# Patient Record
Sex: FEMALE | Race: WHITE | Employment: OTHER | URBAN - NONMETROPOLITAN AREA
[De-identification: names, ages, dates, MRNs, and addresses within clinical notes are randomized per-mention and may not be internally consistent; named-entity substitution may affect disease eponyms.]

---

## 2022-11-22 ENCOUNTER — HOSPITAL ENCOUNTER (OUTPATIENT)
Dept: MRI IMAGING | Age: 72
Discharge: HOME | End: 2022-11-22

## 2022-11-22 DIAGNOSIS — Z00.6 CLINICAL TRIAL PARTICIPANT: ICD-10-CM

## 2022-11-22 PROCEDURE — 70551 MRI BRAIN STEM W/O DYE: CPT

## 2022-12-06 ENCOUNTER — HOSPITAL ENCOUNTER (OUTPATIENT)
Age: 72
Discharge: HOME | End: 2022-12-06

## 2022-12-14 ENCOUNTER — HOSPITAL ENCOUNTER (INPATIENT)
Age: 72
LOS: 4 days | Discharge: HOME | DRG: 066 | End: 2022-12-19
Attending: EMERGENCY MEDICINE | Admitting: HOSPITALIST
Payer: MEDICARE

## 2022-12-14 DIAGNOSIS — R42 DIZZINESS: ICD-10-CM

## 2022-12-14 DIAGNOSIS — J10.1 INFLUENZA A: ICD-10-CM

## 2022-12-14 DIAGNOSIS — R11.2 INTRACTABLE NAUSEA AND VOMITING: Primary | ICD-10-CM

## 2022-12-14 LAB
ALBUMIN SERPL-MCNC: 3.6 GM/DL (ref 3.4–5)
ALP BLD-CCNC: 140 U/L (ref 46–116)
ALT SERPL-CCNC: 32 U/L (ref 14–63)
ANION GAP: 12 MEQ/L (ref 8–16)
AST SERPL-CCNC: 26 U/L (ref 15–37)
BASOPHILS # BLD: 0.2 % (ref 0–3)
BASOPHILS ABSOLUTE: 0 THOU/MM3 (ref 0–0.1)
BILIRUB SERPL-MCNC: 0.5 MG/DL (ref 0.2–1)
BUN BLDV-MCNC: 19 MG/DL (ref 7–18)
CHLORIDE BLD-SCNC: 103 MEQ/L (ref 98–107)
CO2: 25 MEQ/L (ref 21–32)
CREAT SERPL-MCNC: 1.3 MG/DL (ref 0.6–1.3)
EOSINOPHILS ABSOLUTE: 0 THOU/MM3 (ref 0–0.5)
EOSINOPHILS RELATIVE PERCENT: 0.3 % (ref 0–4)
FLU A ANTIGEN: POSITIVE
FLU B ANTIGEN: NEGATIVE
GFR, ESTIMATED: 44 ML/MIN/1.73M2
GLUCOSE BLD-MCNC: 204 MG/DL (ref 74–106)
HCT VFR BLD CALC: 35.3 % (ref 37–47)
HEMOGLOBIN: 11.8 GM/DL (ref 12–16)
IMMATURE GRANS (ABS): 0.02 THOU/MM3 (ref 0–0.07)
IMMATURE GRANULOCYTES: 0 %
LIPASE: 135 U/L (ref 73–393)
LYMPHOCYTES # BLD: 10.6 % (ref 15–47)
LYMPHOCYTES ABSOLUTE: 0.9 THOU/MM3 (ref 1–4.8)
MCH RBC QN AUTO: 29.3 PG (ref 26–32)
MCHC RBC AUTO-ENTMCNC: 33.4 GM/DL (ref 31–35)
MCV RBC AUTO: 87.6 FL (ref 81–99)
MONOCYTES: 0.6 THOU/MM3 (ref 0.3–1.3)
MONOCYTES: 6.5 % (ref 0–12)
PDW BLD-RTO: 13 % (ref 11.5–14.9)
PLATELET # BLD: 200 THOU/MM3 (ref 130–400)
PMV BLD AUTO: 9.3 FL (ref 9.4–12.4)
POC CALCIUM: 8.3 MG/DL (ref 8.5–10.1)
POTASSIUM SERPL-SCNC: 3.8 MEQ/L (ref 3.5–5.1)
RBC # BLD: 4.03 MILL/MM3 (ref 4.1–5.3)
SARS-COV-2, NAAT: NOT  DETECTED
SEG NEUTROPHILS: 82.2 % (ref 43–75)
SEGMENTED NEUTROPHILS ABSOLUTE COUNT: 7.2 THOU/MM3 (ref 1.8–7.7)
SODIUM BLD-SCNC: 140 MEQ/L (ref 136–145)
TOTAL PROTEIN: 7.2 GM/DL (ref 6.4–8.2)
WBC # BLD: 8.8 THOU/MM3 (ref 4.8–10.8)

## 2022-12-14 PROCEDURE — 87804 INFLUENZA ASSAY W/OPTIC: CPT

## 2022-12-14 PROCEDURE — 93005 ELECTROCARDIOGRAM TRACING: CPT | Performed by: EMERGENCY MEDICINE

## 2022-12-14 PROCEDURE — 85025 COMPLETE CBC W/AUTO DIFF WBC: CPT

## 2022-12-14 PROCEDURE — 99285 EMERGENCY DEPT VISIT HI MDM: CPT

## 2022-12-14 PROCEDURE — 87635 SARS-COV-2 COVID-19 AMP PRB: CPT

## 2022-12-14 PROCEDURE — 96375 TX/PRO/DX INJ NEW DRUG ADDON: CPT

## 2022-12-14 PROCEDURE — 83690 ASSAY OF LIPASE: CPT

## 2022-12-14 PROCEDURE — 80053 COMPREHEN METABOLIC PANEL: CPT

## 2022-12-14 PROCEDURE — 2580000003 HC RX 258: Performed by: EMERGENCY MEDICINE

## 2022-12-14 PROCEDURE — 96374 THER/PROPH/DIAG INJ IV PUSH: CPT

## 2022-12-14 PROCEDURE — 6360000002 HC RX W HCPCS: Performed by: EMERGENCY MEDICINE

## 2022-12-14 RX ORDER — 0.9 % SODIUM CHLORIDE 0.9 %
1000 INTRAVENOUS SOLUTION INTRAVENOUS ONCE
Status: COMPLETED | OUTPATIENT
Start: 2022-12-14 | End: 2022-12-15

## 2022-12-14 RX ORDER — ONDANSETRON 2 MG/ML
4 INJECTION INTRAMUSCULAR; INTRAVENOUS ONCE
Status: COMPLETED | OUTPATIENT
Start: 2022-12-14 | End: 2022-12-14

## 2022-12-14 RX ADMIN — SODIUM CHLORIDE 1000 ML: 9 INJECTION, SOLUTION INTRAVENOUS at 22:26

## 2022-12-14 RX ADMIN — ONDANSETRON 4 MG: 2 INJECTION INTRAMUSCULAR; INTRAVENOUS at 22:28

## 2022-12-14 ASSESSMENT — ENCOUNTER SYMPTOMS
ABDOMINAL PAIN: 1
WHEEZING: 0
SORE THROAT: 0
NAUSEA: 1
VOMITING: 1
EYE DISCHARGE: 0
BLOOD IN STOOL: 0
EYE PAIN: 0
SHORTNESS OF BREATH: 0
DIARRHEA: 1

## 2022-12-14 ASSESSMENT — PAIN SCALES - GENERAL: PAINLEVEL_OUTOF10: 1

## 2022-12-14 ASSESSMENT — PAIN DESCRIPTION - LOCATION: LOCATION: ARM

## 2022-12-14 ASSESSMENT — LIFESTYLE VARIABLES: HOW OFTEN DO YOU HAVE A DRINK CONTAINING ALCOHOL: NEVER

## 2022-12-14 ASSESSMENT — PAIN DESCRIPTION - ORIENTATION: ORIENTATION: LEFT

## 2022-12-14 ASSESSMENT — PAIN - FUNCTIONAL ASSESSMENT: PAIN_FUNCTIONAL_ASSESSMENT: NONE - DENIES PAIN

## 2022-12-15 ENCOUNTER — APPOINTMENT (OUTPATIENT)
Dept: MRI IMAGING | Age: 72
DRG: 066 | End: 2022-12-15
Payer: MEDICARE

## 2022-12-15 ENCOUNTER — APPOINTMENT (OUTPATIENT)
Dept: CT IMAGING | Age: 72
DRG: 066 | End: 2022-12-15
Payer: MEDICARE

## 2022-12-15 PROBLEM — R42 VERTIGO: Status: ACTIVE | Noted: 2022-12-15

## 2022-12-15 LAB
ANION GAP SERPL CALCULATED.3IONS-SCNC: 11 MEQ/L (ref 8–16)
BUN BLDV-MCNC: 20 MG/DL (ref 7–22)
CALCIUM SERPL-MCNC: 8.4 MG/DL (ref 8.5–10.5)
CHLORIDE BLD-SCNC: 105 MEQ/L (ref 98–111)
CO2: 24 MEQ/L (ref 23–33)
CREAT SERPL-MCNC: 1.2 MG/DL (ref 0.4–1.2)
EKG ATRIAL RATE: 68 BPM
EKG P AXIS: -3 DEGREES
EKG P-R INTERVAL: 160 MS
EKG Q-T INTERVAL: 410 MS
EKG QRS DURATION: 74 MS
EKG QTC CALCULATION (BAZETT): 435 MS
EKG R AXIS: -40 DEGREES
EKG T AXIS: 66 DEGREES
EKG VENTRICULAR RATE: 68 BPM
GFR SERPL CREATININE-BSD FRML MDRD: 48 ML/MIN/1.73M2
GLUCOSE BLD-MCNC: 107 MG/DL (ref 70–108)
GLUCOSE BLD-MCNC: 122 MG/DL (ref 70–108)
GLUCOSE BLD-MCNC: 128 MG/DL (ref 70–108)
GLUCOSE BLD-MCNC: 153 MG/DL (ref 70–108)
POTASSIUM SERPL-SCNC: 4 MEQ/L (ref 3.5–5.2)
SODIUM BLD-SCNC: 140 MEQ/L (ref 135–145)

## 2022-12-15 PROCEDURE — 97535 SELF CARE MNGMENT TRAINING: CPT

## 2022-12-15 PROCEDURE — 99223 1ST HOSP IP/OBS HIGH 75: CPT | Performed by: PHYSICIAN ASSISTANT

## 2022-12-15 PROCEDURE — 36415 COLL VENOUS BLD VENIPUNCTURE: CPT

## 2022-12-15 PROCEDURE — 82948 REAGENT STRIP/BLOOD GLUCOSE: CPT

## 2022-12-15 PROCEDURE — 97162 PT EVAL MOD COMPLEX 30 MIN: CPT

## 2022-12-15 PROCEDURE — 6360000002 HC RX W HCPCS: Performed by: PHYSICIAN ASSISTANT

## 2022-12-15 PROCEDURE — 6370000000 HC RX 637 (ALT 250 FOR IP): Performed by: EMERGENCY MEDICINE

## 2022-12-15 PROCEDURE — 70551 MRI BRAIN STEM W/O DYE: CPT

## 2022-12-15 PROCEDURE — 93010 ELECTROCARDIOGRAM REPORT: CPT | Performed by: INTERNAL MEDICINE

## 2022-12-15 PROCEDURE — 6360000002 HC RX W HCPCS: Performed by: EMERGENCY MEDICINE

## 2022-12-15 PROCEDURE — 6370000000 HC RX 637 (ALT 250 FOR IP): Performed by: PHYSICIAN ASSISTANT

## 2022-12-15 PROCEDURE — 97166 OT EVAL MOD COMPLEX 45 MIN: CPT

## 2022-12-15 PROCEDURE — 97530 THERAPEUTIC ACTIVITIES: CPT

## 2022-12-15 PROCEDURE — 1200000003 HC TELEMETRY R&B

## 2022-12-15 PROCEDURE — 70450 CT HEAD/BRAIN W/O DYE: CPT

## 2022-12-15 PROCEDURE — 80048 BASIC METABOLIC PNL TOTAL CA: CPT

## 2022-12-15 RX ORDER — ENOXAPARIN SODIUM 100 MG/ML
40 INJECTION SUBCUTANEOUS DAILY
Status: DISCONTINUED | OUTPATIENT
Start: 2022-12-15 | End: 2022-12-19 | Stop reason: HOSPADM

## 2022-12-15 RX ORDER — ACETAMINOPHEN 650 MG/1
650 SUPPOSITORY RECTAL EVERY 4 HOURS PRN
Status: DISCONTINUED | OUTPATIENT
Start: 2022-12-15 | End: 2022-12-19 | Stop reason: HOSPADM

## 2022-12-15 RX ORDER — ONDANSETRON 4 MG/1
4 TABLET, ORALLY DISINTEGRATING ORAL EVERY 8 HOURS PRN
Status: DISCONTINUED | OUTPATIENT
Start: 2022-12-15 | End: 2022-12-19 | Stop reason: HOSPADM

## 2022-12-15 RX ORDER — DEXTROSE MONOHYDRATE 100 MG/ML
INJECTION, SOLUTION INTRAVENOUS CONTINUOUS PRN
Status: DISCONTINUED | OUTPATIENT
Start: 2022-12-15 | End: 2022-12-19 | Stop reason: HOSPADM

## 2022-12-15 RX ORDER — ACETAMINOPHEN 325 MG/1
650 TABLET ORAL EVERY 4 HOURS PRN
Status: DISCONTINUED | OUTPATIENT
Start: 2022-12-15 | End: 2022-12-19 | Stop reason: HOSPADM

## 2022-12-15 RX ORDER — SODIUM CHLORIDE 9 MG/ML
INJECTION, SOLUTION INTRAVENOUS CONTINUOUS
Status: DISCONTINUED | OUTPATIENT
Start: 2022-12-15 | End: 2022-12-17

## 2022-12-15 RX ORDER — ONDANSETRON 2 MG/ML
4 INJECTION INTRAMUSCULAR; INTRAVENOUS EVERY 6 HOURS PRN
Status: DISCONTINUED | OUTPATIENT
Start: 2022-12-15 | End: 2022-12-19 | Stop reason: HOSPADM

## 2022-12-15 RX ORDER — INSULIN LISPRO 100 [IU]/ML
0-4 INJECTION, SOLUTION INTRAVENOUS; SUBCUTANEOUS
Status: DISCONTINUED | OUTPATIENT
Start: 2022-12-15 | End: 2022-12-19 | Stop reason: HOSPADM

## 2022-12-15 RX ORDER — MECLIZINE HCL 25MG 25 MG/1
25 TABLET, CHEWABLE ORAL 3 TIMES DAILY PRN
Status: DISCONTINUED | OUTPATIENT
Start: 2022-12-15 | End: 2022-12-16

## 2022-12-15 RX ORDER — MECLIZINE HCL 25MG 25 MG/1
25 TABLET, CHEWABLE ORAL ONCE
Status: COMPLETED | OUTPATIENT
Start: 2022-12-15 | End: 2022-12-15

## 2022-12-15 RX ORDER — KETOROLAC TROMETHAMINE 30 MG/ML
15 INJECTION, SOLUTION INTRAMUSCULAR; INTRAVENOUS ONCE
Status: COMPLETED | OUTPATIENT
Start: 2022-12-15 | End: 2022-12-15

## 2022-12-15 RX ORDER — ASPIRIN 300 MG/1
300 SUPPOSITORY RECTAL DAILY
Status: DISCONTINUED | OUTPATIENT
Start: 2022-12-15 | End: 2022-12-19 | Stop reason: HOSPADM

## 2022-12-15 RX ORDER — ASPIRIN 81 MG/1
81 TABLET ORAL DAILY
Status: DISCONTINUED | OUTPATIENT
Start: 2022-12-15 | End: 2022-12-19 | Stop reason: HOSPADM

## 2022-12-15 RX ORDER — POLYETHYLENE GLYCOL 3350 17 G/17G
17 POWDER, FOR SOLUTION ORAL DAILY PRN
Status: DISCONTINUED | OUTPATIENT
Start: 2022-12-15 | End: 2022-12-19 | Stop reason: HOSPADM

## 2022-12-15 RX ORDER — INSULIN LISPRO 100 [IU]/ML
0-4 INJECTION, SOLUTION INTRAVENOUS; SUBCUTANEOUS NIGHTLY
Status: DISCONTINUED | OUTPATIENT
Start: 2022-12-15 | End: 2022-12-19 | Stop reason: HOSPADM

## 2022-12-15 RX ORDER — ONDANSETRON 2 MG/ML
4 INJECTION INTRAMUSCULAR; INTRAVENOUS ONCE
Status: COMPLETED | OUTPATIENT
Start: 2022-12-15 | End: 2022-12-15

## 2022-12-15 RX ORDER — ATORVASTATIN CALCIUM 80 MG/1
80 TABLET, FILM COATED ORAL NIGHTLY
Status: DISCONTINUED | OUTPATIENT
Start: 2022-12-15 | End: 2022-12-19 | Stop reason: HOSPADM

## 2022-12-15 RX ADMIN — MECLIZINE HYDROCHLORIDE 25 MG: 25 TABLET, CHEWABLE ORAL at 00:27

## 2022-12-15 RX ADMIN — ATORVASTATIN CALCIUM 80 MG: 80 TABLET, FILM COATED ORAL at 20:37

## 2022-12-15 RX ADMIN — ACETAMINOPHEN 650 MG: 325 TABLET ORAL at 20:37

## 2022-12-15 RX ADMIN — ENOXAPARIN SODIUM 40 MG: 100 INJECTION SUBCUTANEOUS at 08:02

## 2022-12-15 RX ADMIN — KETOROLAC TROMETHAMINE 15 MG: 30 INJECTION, SOLUTION INTRAMUSCULAR; INTRAVENOUS at 01:24

## 2022-12-15 RX ADMIN — ONDANSETRON 4 MG: 2 INJECTION INTRAMUSCULAR; INTRAVENOUS at 07:58

## 2022-12-15 RX ADMIN — ACETAMINOPHEN 650 MG: 325 TABLET ORAL at 15:06

## 2022-12-15 RX ADMIN — ACETAMINOPHEN 650 MG: 325 TABLET ORAL at 05:38

## 2022-12-15 RX ADMIN — ONDANSETRON 4 MG: 2 INJECTION INTRAMUSCULAR; INTRAVENOUS at 03:07

## 2022-12-15 ASSESSMENT — ENCOUNTER SYMPTOMS
PHOTOPHOBIA: 0
ABDOMINAL PAIN: 0
VOMITING: 0
DIARRHEA: 1
RHINORRHEA: 0
COUGH: 1
EYES NEGATIVE: 1
SORE THROAT: 0
SHORTNESS OF BREATH: 0
NAUSEA: 1
ALLERGIC/IMMUNOLOGIC NEGATIVE: 1
GASTROINTESTINAL NEGATIVE: 1

## 2022-12-15 ASSESSMENT — PAIN SCALES - GENERAL
PAINLEVEL_OUTOF10: 5
PAINLEVEL_OUTOF10: 2
PAINLEVEL_OUTOF10: 0
PAINLEVEL_OUTOF10: 9
PAINLEVEL_OUTOF10: 9
PAINLEVEL_OUTOF10: 5
PAINLEVEL_OUTOF10: 5

## 2022-12-15 ASSESSMENT — PAIN DESCRIPTION - DESCRIPTORS
DESCRIPTORS: ACHING
DESCRIPTORS_2: ACHING
DESCRIPTORS: DULL
DESCRIPTORS: ACHING
DESCRIPTORS: ACHING

## 2022-12-15 ASSESSMENT — PAIN - FUNCTIONAL ASSESSMENT
PAIN_FUNCTIONAL_ASSESSMENT: ACTIVITIES ARE NOT PREVENTED
PAIN_FUNCTIONAL_ASSESSMENT: NONE - DENIES PAIN
PAIN_FUNCTIONAL_ASSESSMENT: ACTIVITIES ARE NOT PREVENTED
PAIN_FUNCTIONAL_ASSESSMENT_SITE2: ACTIVITIES ARE NOT PREVENTED
PAIN_FUNCTIONAL_ASSESSMENT: ACTIVITIES ARE NOT PREVENTED
PAIN_FUNCTIONAL_ASSESSMENT: NONE - DENIES PAIN

## 2022-12-15 ASSESSMENT — PAIN DESCRIPTION - ORIENTATION
ORIENTATION: ANTERIOR;POSTERIOR;MID
ORIENTATION_2: POSTERIOR;ANTERIOR
ORIENTATION: LEFT

## 2022-12-15 ASSESSMENT — PAIN DESCRIPTION - LOCATION
LOCATION: HEAD
LOCATION: ARM
LOCATION_2: HEAD
LOCATION: HEAD;ABDOMEN
LOCATION: ARM
LOCATION: ARM

## 2022-12-15 ASSESSMENT — PAIN DESCRIPTION - PAIN TYPE
TYPE: ACUTE PAIN
TYPE: ACUTE PAIN

## 2022-12-15 ASSESSMENT — PAIN DESCRIPTION - INTENSITY: RATING_2: 0

## 2022-12-15 ASSESSMENT — PAIN DESCRIPTION - ONSET
ONSET: ON-GOING
ONSET: ON-GOING

## 2022-12-15 ASSESSMENT — PAIN DESCRIPTION - FREQUENCY
FREQUENCY: CONTINUOUS
FREQUENCY: INTERMITTENT

## 2022-12-15 NOTE — CONSULTS
Neurology Consult Note    Date:12/15/2022       GBJ:[id number]  Patient Rachel Yi     YOB: 1950     Age:72 y.o. Requesting Physician: PARDEEP Gonzalez     Reason for Consult:  Evaluate for vertigo, vision changes      Chief Complaint:   Chief Complaint   Patient presents with    Emesis    Dizziness    Fall    Diarrhea       Subjective     Rajan Dewey is a 71 y.o. female with a history of diabetes, HTN who is admitted to [address] [place] [address] as a direct admission from [facility] where she presented last night due to two days of generalized weakness, nausea, dizziness, diarrhea. Our service was consulted to evaluate for vertigo and vision changes. She reports falling yesterday due to the weakness, but denies hitting her head. She endorses feeling as if the room is spinning around her. Today, she denies any vision changes. She is currently influenza A positive. She denies any events like this ever happening before. She reports \"walking as if she were drunk\" and staggering. She describes her headache as a bifrontal and posterior occipital that is improving today from prior. She denies any confusion at any point over the last two days. She denies vision changes, difficulty speaking or understanding, sensory loss, numbness or tingling, chest pain, SOB, tremors, LOC, focal weakness. She admits to chills, nasal congestion, cough, generalized weakness and fatigue. She reports improvement of nausea and diarrhea, although she has decreased appetite. She denies personal history of stroke, but reports her father and one of her grandparents had a stroke. She was started on ASA 81 mg, Lipitor 80 mg and meclizine 25 mg PRN overnight. She is also on IVF since admission. Of note, patient is a current participant in a clinical trial for Alzheimer disease. She also reports nausea correlating to dizziness at times, but not all of the time. CT head from outside facility negative for acute findings.      Review of Systems   Review of Systems   Constitutional:  Positive for appetite change and fatigue. Negative for chills and fever. HENT:  Positive for congestion. Negative for rhinorrhea and sore throat. Eyes:  Negative for photophobia and visual disturbance. Respiratory:  Positive for cough. Negative for shortness of breath. Cardiovascular:  Negative for chest pain and palpitations. Gastrointestinal:  Positive for diarrhea and nausea. Negative for abdominal pain and vomiting. Genitourinary:  Negative for dysuria. Musculoskeletal:  Positive for myalgias (LUE). Negative for arthralgias, neck pain and neck stiffness. Skin:  Negative for rash. Neurological:  Positive for dizziness, weakness and headaches. Negative for tremors, seizures, facial asymmetry, speech difficulty and numbness. Psychiatric/Behavioral:  Negative for confusion and decreased concentration. The patient is nervous/anxious. Medications   Scheduled Meds:    enoxaparin  40 mg SubCUTAneous Daily    aspirin  81 mg Oral Daily    Or    aspirin  300 mg Rectal Daily    atorvastatin  80 mg Oral Nightly    insulin lispro  0-4 Units SubCUTAneous TID WC    insulin lispro  0-4 Units SubCUTAneous Nightly     Continuous Infusions:    sodium chloride 75 mL/hr at 12/15/22 0544    dextrose       PRN Meds: ondansetron **OR** ondansetron, polyethylene glycol, acetaminophen **OR** acetaminophen, meclizine, glucose, dextrose bolus **OR** dextrose bolus, glucagon (rDNA), dextrose  Medications Prior to Admission:   No current facility-administered medications on file prior to encounter. Current Outpatient Medications on File Prior to Encounter   Medication Sig Dispense Refill    metFORMIN (GLUCOPHAGE) 500 MG tablet Take 250 mg by mouth 2 times daily (with meals)      Naproxen (NAPROSYN PO) Take by mouth as needed (for pain)       Past History    Past Medical History:   has a past medical history of Diabetes mellitus ([facility] [address]     Social History:   reports that she has never smoked. She has never been exposed to tobacco smoke. She has never used smokeless tobacco. She reports that she does not drink alcohol and does not use drugs. Family History: History reviewed. No pertinent family history. Physical Examination      Vitals:  BP (!) 171/85   Pulse 73   Temp 97.9 °F (36.6 °C) (Oral)   Resp 18   Ht 5' 3.5\" (1.613 m)   Wt 165 lb 9.1 oz (75.1 kg)   SpO2 99%   BMI 28.87 kg/m²   Temp (24hrs), Av.3 °F (36.3 °C), Min:96 °F (35.6 °C), Max:98.4 °F (36.9 °C)      I/O (24Hr): No intake or output data in the 24 hours ending 12/15/22 0844      Physical Exam  Vitals reviewed. Constitutional:       General: She is not in acute distress. Appearance: She is ill-appearing. HENT:      Head: Normocephalic and atraumatic. Right Ear: External ear normal.      Left Ear: External ear normal.      Nose: Nose normal.      Mouth/Throat:      Mouth: Mucous membranes are dry. Pharynx: No oropharyngeal exudate or posterior oropharyngeal erythema. Eyes:      Extraocular Movements: Extraocular movements intact and EOM normal.      Pupils: Pupils are equal, round, and reactive to light. Cardiovascular:      Rate and Rhythm: Normal rate and regular rhythm. Pulses: Normal pulses. Heart sounds: Normal heart sounds. No murmur heard. Pulmonary:      Effort: Pulmonary effort is normal. No respiratory distress. Breath sounds: Normal breath sounds. No wheezing. Abdominal:      General: Bowel sounds are normal.      Palpations: Abdomen is soft. Tenderness: There is no abdominal tenderness. Musculoskeletal:         General: Normal range of motion. Cervical back: Normal range of motion. No tenderness. Right lower leg: No edema. Left lower leg: No edema. Skin:     General: Skin is warm. Findings: No rash. Neurological:      Mental Status: She is alert and oriented to person, place, and time.       Coordination: Finger-Nose-Finger Test and Heel to NIX [facility] Test normal.   Psychiatric:         Mood and Affect: Mood normal.         Speech: Speech normal.         Behavior: Behavior normal.     Neurologic Exam     Mental Status   Oriented to person, place, and time. Follows 1 step commands. Attention: normal. Concentration: normal.   Speech: speech is normal   Level of consciousness: alert  Able to repeat. Normal comprehension. Cranial Nerves     CN II   Visual fields full to confrontation. Right visual field deficit: none  Left visual field deficit: none     CN III, IV, VI   Pupils are equal, round, and reactive to light. Extraocular motions are normal.     CN V   Facial sensation intact. Right facial sensation deficit: none  Left facial sensation deficit: none    CN VII   Facial expression full, symmetric. Right facial weakness: none  Left facial weakness: none    CN VIII   CN VIII normal.   Hearing: intact    CN IX, X   CN IX normal.   CN X normal.   Palate: symmetric    CN XI   CN XI normal.   Right sternocleidomastoid strength: normal  Left sternocleidomastoid strength: normal  Right trapezius strength: normal  Left trapezius strength: normal    CN XII   CN XII normal.   Tongue: not atrophic  Fasciculations: absent  Tongue deviation: none    Motor Exam   Muscle bulk: normal  Overall muscle tone: normal  Right arm pronator drift: absent  Left arm pronator drift: absent  Muscle strength 5/5 bilateral upper extremities, 4+/5 bilateral lower extremities. Sensory Exam   Light touch normal.     Gait, Coordination, and Reflexes     Coordination   Finger to nose coordination: normal  Heel to shin coordination: normal    Tremor   Resting tremor: absent  No adventitious motor movements noted on exam. Patient unable to march in place unassisted.  She is able to stand unassisted for a short period of time once stabilized on her feet with her eyes opened and closed, but does sway forward and backward - more so with eyes closed      Labs/Imaging/Diagnostics   Labs:  CBC:  Recent Labs     12/14/22 2221   WBC 8.8   RBC 4.03*   HGB 11.8*   HCT 35.3*   MCV 87.6   RDW 13.0        CHEMISTRIES:  Recent Labs     12/14/22  2221 12/15/22  0756    140   K 3.8 4.0    105   CO2 25 24   BUN 19* 20   CREATININE 1.3 1.2   GLUCOSE 204* 153*     COAGULATION STUDIES:No results for input(s): PROTIME, INR, APTT in the last 72 hours. LIVER PROFILE:  Recent Labs     12/14/22 2221   AST 26   ALT 32   BILITOT 0.5   ALKPHOS 140*     CHOLESTEROL AND A1C:No results for input(s): LDLCALC, HDL, CHOL, TRIG, LABA1C in the last 720 hours. Imaging Last 24 Hours:  CT HEAD WO CONTRAST    Result Date: 12/15/2022  CT head without contrast Comparison: MRI brain November 22, 2022 Findings: No intracranial mass, midline shift, hydrocephalus, or acute hemorrhage. Minimal changes of chronic microvascular ischemic disease. Mild mucosal thickening of the ethmoid sinuses. The orbits are unremarkable. No skull fracture. 1. No acute intracranial process. 2. Minimal changes of chronic microvascular ischemic disease. This document has been electronically signed by: Asim Martinez DO on 12/15/2022 03:26 AM All CTs at this facility use dose modulation techniques and iterative reconstructions, and/or weight-based dosing when appropriate to reduce radiation to a low as reasonably achievable. Assessment and Plan:        Dizziness, likely vertigo secondary to labyrinthitis as an extension of current influenza infection  Orthostatic hypotension   CT head WO: negative for intracranial abnormalities  Neurology orthostatics positive from laying to standing, although unable to stand for 5 minutes for a final BP/HR. CTA head and neck ordered. MRI brain WO ordered. Maintain adequate fluid hydration. IVF as needed, per primary. Encourage adequate nutrition. Agree with continued infectious and metabolic work-up with treatment as applicable, per primary. Continue ASA 81 mg   HgbA1C in the morning. If the patient has diabetes, recommend tight control of A1c with goal of <7.0 if attainable. Lipid panel in the morning. LDL goal of 45-70. Continue Lipitor 80 mg, pending lipid panel. Neurology following peripherally, pending CTA H&N/MRI    This patient was seen and evaluated with Dr. Kenan Loera and he is in agreement with the assessment and plan.     Electronically signed by Carmelo Flores PA-C on 12/15/22 at 3:26 PM EST

## 2022-12-15 NOTE — ED NOTES
[facility] [employer] arrived, report given to [employer].       Palma Holter, RN  12/15/22 6021

## 2022-12-15 NOTE — H&P
Hospitalist - History & Physical      Patient: Richard Das    Unit/Bed:-02/002-A  YOB: 1950  MRN: [id number]   Acct: [de-identified]   PCP: Jeane Lennox , APRN - CNP      Assessment and Plan:        Vertigo:   MRI brain without contrast  2D ECHO  Permissive hypertension  Meclizine prn  Start statin and ASA  Essential hypertension:   Permissive hypertension for now  Patient states her oral agents were all stopped because her blood pressures were normal - requires outpatient follow up  DM II:   Low dose sliding scale  Hypoglycemic treatment orders  ADA diet  Influenza A  Supportive care  On room air      CC:  dizziness    HPI: Patient transferred from [facility] for further evaluation of vertigo. The patient reports that one day prior to admission she started having \"dizziness\" that she best describes as the room spinning. The patient denies any focal deficits. She reports some changes in her vision - difficulty focusing. She also complains of a headache. Patient is admitted for further evaluation and stroke rule out. ROS: Review of Systems   Constitutional:  Negative for fever. HENT: Negative. Eyes: Negative. Respiratory:  Positive for cough. Negative for shortness of breath. Cardiovascular: Negative. Gastrointestinal: Negative. Endocrine: Negative. Genitourinary: Negative. Musculoskeletal:  Positive for arthralgias and myalgias. Skin: Negative. Allergic/Immunologic: Negative. Neurological:  Positive for dizziness and headaches. Hematological: Negative. Psychiatric/Behavioral: Negative.        PMH:    Past Medical History:   Diagnosis Date    Diabetes mellitus ([facility] [address]      SHX:    Social History     Socioeconomic History    Marital status: Single     Spouse name: Not on file    Number of children: Not on file    Years of education: Not on file    Highest education level: Not on file   Occupational History    Not on file Tobacco Use    Smoking status: Never     Passive exposure: Never    Smokeless tobacco: Never   Vaping Use    Vaping Use: Never used   Substance and Sexual Activity    Alcohol use: Never    Drug use: Never    Sexual activity: Never   Other Topics Concern    Not on file   Social History Narrative    Not on file     Social Determinants of Health     Financial Resource Strain: Not on file   Food Insecurity: Not on file   Transportation Needs: Not on file   Physical Activity: Not on file   Stress: Not on file   Social Connections: Not on file   Intimate Partner Violence: Not on file   Housing Stability: Not on file     FHX: History reviewed. No pertinent family history. Allergies: Allergies   Allergen Reactions    Other      Medication given for a urinary infection, allergic to it, passed out.      Medications:     sodium chloride        enoxaparin  40 mg SubCUTAneous Daily    aspirin  81 mg Oral Daily    Or    aspirin  300 mg Rectal Daily    atorvastatin  80 mg Oral Nightly     ondansetron, 4 mg, Q8H PRN   Or  ondansetron, 4 mg, Q6H PRN  polyethylene glycol, 17 g, Daily PRN  acetaminophen, 650 mg, Q4H PRN   Or  acetaminophen, 650 mg, Q4H PRN        Labs:   Recent Results (from the past 24 hour(s))   EKG Emergency    Collection Time: 12/14/22  9:55 PM   Result Value Ref Range    Ventricular Rate 68 BPM    Atrial Rate 68 BPM    P-R Interval 160 ms    QRS Duration 74 ms    Q-T Interval 410 ms    QTc Calculation (Bazett) 435 ms    P Axis -3 degrees    R Axis -40 degrees    T Axis 66 degrees   CBC with Auto Differential    Collection Time: 12/14/22 10:21 PM   Result Value Ref Range    WBC 8.8 4.8 - 10.8 thou/mm3    RBC 4.03 (L) 4.10 - 5.30 mill/mm3    Hemoglobin 11.8 (L) 12.0 - 16.0 gm/dl    Hematocrit 35.3 (L) 37.0 - 47.0 %    MCV 87.6 81.0 - 99.0 fL    MCH 29.3 26.0 - 32.0 pg    MCHC 33.4 31.0 - 35.0 gm/dl    RDW 13.0 11.5 - 14.9 %    Platelets 945 719 - 643 thou/mm3    MPV 9.3 (L) 9.4 - 12.4 fL    Seg Neutrophils 82.2 (H) 43.0 - 75.0 %    Segs Absolute 7.2 1.8 - 7.7 thou/mm3    Lymphocytes 10.6 (L) 15.0 - 47.0 %    Lymphocytes Absolute 0.9 (L) 1.0 - 4.8 thou/mm3    Monocytes 6.5 0.0 - 12.0 %    Monocytes 0.6 0.3 - 1.3 thou/mm3    Eosinophils % 0.3 0.0 - 4.0 %    Eosinophils Absolute 0.0 0.0 - 0.5 thou/mm3    Basophils 0.2 0.0 - 3.0 %    Basophils Absolute 0.0 0.0 - 0.1 thou/mm3    Immature Granulocytes 0 %    Immature Grans (Abs) 0.02 0.00 - 0.07 thou/mm3   Lipase    Collection Time: 12/14/22 10:21 PM   Result Value Ref Range    Lipase 135.0 73.0 - 393.0 U/L   Comprehensive Metabolic Panel    Collection Time: 12/14/22 10:21 PM   Result Value Ref Range    Glucose 204 (H) 74 - 106 mg/dl    Creatinine 1.3 0.6 - 1.3 mg/dl    BUN 19 (H) 7 - 18 mg/dl    Sodium 140 136 - 145 meq/l    Potassium 3.8 3.5 - 5.1 meq/l    Chloride 103 98 - 107 meq/l    CO2 25 21 - 32 meq/l    POC CALCIUM 8.3 (L) 8.5 - 10.1 mg/dl    AST 26 15 - 37 U/L    Alkaline Phosphatase 140 (H) 46 - 116 U/L    Total Protein 7.2 6.4 - 8.2 gm/dl    Albumin 3.6 3.4 - 5.0 gm/dl    Total Bilirubin 0.5 0.2 - 1.0 mg/dl    ALT 32 14 - 63 U/L   Glomerular Filtration Rate, Estimated    Collection Time: 12/14/22 10:21 PM   Result Value Ref Range    GFR, Estimated 44 (A) >60 ml/min/1.73m2   ANION GAP    Collection Time: 12/14/22 10:21 PM   Result Value Ref Range    Anion Gap 12.0 8.0 - 16.0 meq/l   COVID-19, Rapid    Collection Time: 12/14/22 10:23 PM    Specimen: Nasopharyngeal Swab   Result Value Ref Range    SARS-CoV-2, NAAT NOT  DETECTED NOT DETECTED   Rapid influenza A/B antigens    Collection Time: 12/14/22 10:23 PM    Specimen: Nasopharyngeal   Result Value Ref Range    Flu A Antigen Positive (A) NEGATIVE    Flu B Antigen Negative NEGATIVE         Vital Signs: T: 98.4F P: 75 RR: 17 B/P: 167/66: FiO2: RA: O2 Sat:95%: I/O: No intake or output data in the 24 hours ending 12/15/22 0508      General:   no acute distress  HEENT:  normocephalic and atraumatic. No scleral icterus. PEARLA, mucous membranes moist, patient does have some nystagmus but doesn't tolerate moving her eyes quickly  Neck: supple. Trachea midline. No JVD. Full ROM, no meningismus. Lungs: clear to auscultation. No retractions, no accessory muscle use. Cardiac: RRR, no murmur, 2+ pulses  Abdomen: soft. Nontender. Bowel sounds active  Extremities:  No clubbing, cyanosis x 4, no edema    Vasculature: capillary refill < 3 seconds. Skin:  warm and dry. no visible rashes  Psych:  Alert and oriented x3. Affect appropriate  Lymph:  No supraclavicular adenopathy. Neurologic:  CN II-XII grossly intact. No focal deficit. Data: (All radiographs, tracings, PFTs, and imaging are personally viewed and interpreted unless otherwise noted).    Outside data reviewed  EKG: rhythm: normal sinus rhythm, rate=68 bpm, pr=160 ms, qrs=74 ms, ud=985 ms, axis=-3 degrees        Electronically signed by  Sara Vargas PA-C

## 2022-12-15 NOTE — PROGRESS NOTES
Pt admitted to  [facility] via cart/stretcher. Complaints: nausea/vomiting, dizziness, headache. IV normal saline infusing into the antecubital right, condition patent and no redness at a rate of 100 mls/ hour with about 900 mls in the bag still. IV site free of s/s of infection or infiltration. Vital signs obtained. Assessment and data collection initiated. Two nurse skin assessment performed by Shawna Barber RN and Darlin Brandt RN. Oriented to room. Policies and procedures for  explained. All questions answered with no further questions at this time. Fall prevention and safety brochure discussed with patient. Bed alarm on. Call light in reach.

## 2022-12-15 NOTE — ED PROVIDER NOTES
[address]  [address] [facility] [address]  Phone: [phone]    eMERGENCY dEPARTMENT eNCOUnter           [address] [facility]       Chief Complaint   Patient presents with    Emesis    Dizziness    Fall    Diarrhea       Nurses Notes reviewed and I agree except as noted in the HPI. HISTORY OF PRESENT ILLNESS    Perry Silva is a 71 y.o. female who presented via private vehicle with the above-mentioned complaints. She is accompanied sister. She presented with 24-hour history of nausea, vomiting and diarrhea. She has numerous episode of clear emesis and watery diarrhea. She complains of mild diffuse abdominal cramping. She also had lightheadedness or dizziness. She denies visual or speech changes. She denies focal weakness or numbness. She fell struck her left side. She denies injury. She denies chest pain or shortness of breath. She has no fever or chills. She has history of diabetes, she is on metformin. She denies smoking tobacco, drink alcohol or using illicit drugs. REVIEW OF SYSTEMS     Review of Systems   Constitutional:  Positive for fatigue. Negative for chills and fever. HENT:  Positive for congestion. Negative for sore throat. Eyes:  Negative for pain, discharge and visual disturbance. Respiratory:  Negative for shortness of breath and wheezing. Cardiovascular:  Negative for chest pain and palpitations. Gastrointestinal:  Positive for abdominal pain, diarrhea, nausea and vomiting. Negative for blood in stool. Genitourinary:  Negative for dysuria and hematuria. Musculoskeletal:  Negative for neck pain and neck stiffness. Neurological:  Positive for dizziness. Negative for seizures, syncope and headaches. Hematological:  Negative for adenopathy. Psychiatric/Behavioral:  Negative for confusion. PAST MEDICAL HISTORY    has a past medical history of Diabetes mellitus ([facility] [address]     SURGICAL HISTORY      has no past surgical history on file.    CURRENT MEDICATIONS       Previous Medications    METFORMIN (GLUCOPHAGE) 500 MG TABLET    Take 250 mg by mouth 2 times daily (with meals)    NAPROXEN (NAPROSYN PO)    Take by mouth       ALLERGIES     is allergic to other. FAMILY HISTORY     has no family status information on file. family history is not on file. SOCIAL HISTORY      reports that she has never smoked. She has never been exposed to tobacco smoke. She has never used smokeless tobacco. She reports that she does not drink alcohol and does not use drugs. PHYSICAL EXAM     INITIAL VITALS:  height is 5' 3\" (1.6 m) and weight is 155 lb (70.3 kg). Her tympanic temperature is 96 °F (35.6 °C) (abnormal). Her blood pressure is 178/86 (abnormal) and her pulse is 74. Her respiration is 14 and oxygen saturation is 97%. Physical Exam  Vitals and nursing note reviewed. Constitutional:       General: She is in acute distress. Appearance: She is well-developed. She is ill-appearing. Comments: She is awake and alert, she looks mildly ill but nontoxic   HENT:      Head: Atraumatic. Eyes:      Conjunctiva/sclera: Conjunctivae normal.      Pupils: Pupils are equal, round, and reactive to light. Comments: She has horizontal nystagmus. Neck:      Thyroid: No thyromegaly. Vascular: No JVD. Trachea: No tracheal deviation. Cardiovascular:      Rate and Rhythm: Normal rate and regular rhythm. Heart sounds: No murmur heard. No friction rub. No gallop. Pulmonary:      Effort: Pulmonary effort is normal.      Breath sounds: Normal breath sounds. Abdominal:      General: Bowel sounds are normal. There is no distension. Palpations: Abdomen is soft. Tenderness: There is no abdominal tenderness. Musculoskeletal:         General: No tenderness. Cervical back: Neck supple. Neurological:      Mental Status: She is alert and oriented to person, place, and time.       Cranial Nerves: No cranial nerve deficit. Motor: No weakness. Coordination: Coordination normal.      Comments: She has normal finger-to-nose and heel-to-shin bilaterally         DIFFERENTIAL DIAGNOSIS:       DIAGNOSTIC RESULTS     EKG: Interpreted by Charles Boyd MD     Rhythm: normal sinus   Rate: normal  Axis: normal  Ectopy: none  Conduction: normal  ST Segments: no acute change  T Waves: no acute change  Q Waves: none    Clinical Impression: normal sinus rhythm with no acute changes/normal EKG      LABS:   Labs Reviewed   RAPID INFLUENZA A/B ANTIGENS - Abnormal; Notable for the following components:       Result Value    Flu A Antigen Positive (*)     All other components within normal limits   CBC WITH AUTO DIFFERENTIAL - Abnormal; Notable for the following components:    RBC 4.03 (*)     Hemoglobin 11.8 (*)     Hematocrit 35.3 (*)     MPV 9.3 (*)     Seg Neutrophils 82.2 (*)     Lymphocytes 10.6 (*)     Lymphocytes Absolute 0.9 (*)     All other components within normal limits   COMPREHENSIVE METABOLIC PANEL - Abnormal; Notable for the following components:    Glucose 204 (*)     BUN 19 (*)     POC CALCIUM 8.3 (*)     Alkaline Phosphatase 140 (*)     All other components within normal limits   GLOMERULAR FILTRATION RATE, ESTIMATED - Abnormal; Notable for the following components:    GFR, Estimated 44 (*)     All other components within normal limits   COVID-19, RAPID   LIPASE   ANION GAP   Laboratory studies showed mild hyperglycemia. EMERGENCY DEPARTMENT COURSE:   Vitals:    Vitals:    12/14/22 2330 12/14/22 2345 12/15/22 0000 12/15/22 0015   BP: (!) 167/82 (!) 163/78 (!) 173/80 (!) 178/86   Pulse: 76 75 71 74   Resp: 14 18 15 14   Temp:       TempSrc:       SpO2: (!) 85% 96% 93% 97%   Weight:       Height:         MDM:  Presented with acute nausea vomiting and diarrhea. She is complaining of moderate dizziness. She was positive for influenza A. She received normal saline 1 L IV, Zofran 4 mg IV and Antivert 25 mg p.o.   She had a few ice chips but continued to have nausea and dizziness. She could not stand up and walk on her own due to dizziness and weakness. Patient is not stable for discharge. She will be admitted for further IV hydration and stabilization. CONSULTS:  Hospitalist service    FINAL IMPRESSION      1. Intractable nausea and vomiting    2. Influenza A    3. Dizziness          DISPOSITION/PLAN   Admitted, condition is fair.       (Please note that portions of this note were completed with a voice recognition program.  Efforts were made to edit the dictations but occasionally words are mis-transcribed.)    MD Mayo Oliver MD  12/15/22 3250

## 2022-12-15 NOTE — PROGRESS NOTES
Orthostatic VS done per order per Neuro.  Patient not able to tolerated standing for the last 5 minutes to get last blood pressure

## 2022-12-15 NOTE — PROGRESS NOTES
[facility]  INPATIENT OCCUPATIONAL THERAPY  [facility] ORTHOPEDICS 7K  EVALUATION    Time:   Time In: 1106  Time Out: 1200  Timed Code Treatment Minutes: 40 Minutes  Minutes: 54          Date: 12/15/2022  Patient Name: Tracee Faustin,   Gender: female      MRN: [id number]  : 1950  (71 y.o.)  Referring Practitioner: Ann Gee PA-C  Diagnosis: vertigo  Additional Pertinent Hx: Patient transferred from [facility] for further evaluation of vertigo. The patient reports that Tuesday prior to admission she started having \"dizziness\" that she best describes as the room spinning. Later  night pt was taking a bath and when she got out of the tub she felt lightheaded and fell. She reports some changes in her vision - difficulty focusing. She also complains of a headache. DX: vertigo vs. orthostatic hypotension. MRI ordered to rule of CVA    Restrictions/Precautions:  Restrictions/Precautions: Isolation, Fall Risk, General Precautions  Position Activity Restriction  Other position/activity restrictions: droplet precautions- flu, monitor orthos    Subjective  Chart Reviewed: Yes, Orders, History and Physical  Patient assessed for rehabilitation services?: Yes    Subjective: Pt in recliner in supe position upon arrival stating she was waiting for her nurse to take her orthostatic BPs as requested by neuro. Pt is agreeable to OT treatment adn nurse entered shortly after to obtain orthostatics. Pain: denies    Vitals: Orthostatic Blood Pressure: Supine: 168/69, Sittin/80, Standin/90 , after 1 minute: 143/75, after 2 minutes: 159/71    Social/Functional History:  Lives With:  (grandson who is 29.   He works at [employer] in [place])  Type of Home: [address]: One level  Home Access: Stairs to enter with [address] - Number of Steps: 3  Entrance Stairs - Rails: Left  Home Equipment: Walker, rolling   Bathroom Shower/Tub: Tub/Shower unit       ADL Assistance: Independent  Homemaking Assistance: Independent  Homemaking Responsibilities: Yes  Ambulation Assistance: Independent  Transfer Assistance: Independent    Active : Yes  Occupation: Part time employment  Type of Occupation: works at [employer] 2x a week  Additional Comments: she is normally indep with mobility. 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 She has assit with cleaning at home. VISION:WFL    HEARING:  WFL    COGNITION: Decreased Safety Awareness    RANGE OF MOTION:  Bilateral Upper Extremity:  WFL    STRENGTH:  Bilateral Upper Extremity:  Impaired - grossly deconditioned    SENSATION:   WFL    ADL:   Bathing: Stand By Assistance. Upper Extremity Dressing: Stand By Assistance. Lower Extremity Dressing: Stand By Assistance. Toileting: Stand By Assistance. Pt able to perform extensive kassidy care in standing after BM  Toilet Transfer: Stand By Assistance. Josey Schroeder BALANCE:  Standing Balance: Stand By Assistance. Pt tolerates 5+ minutes standing to check orthostatics per neuro request      TRANSFERS:  Sit to Stand:  Contact Guard Assistance. Stand to Sit: Stand By Assistance. FUNCTIONAL MOBILITY:  Assistive Device: Rolling Walker  Assist Level:  Contact Guard Assistance. Distance: To and from bathroom         Activity Tolerance:  Patient tolerance of  treatment: good. Assessment:  Assessment: Sylvester Calderon is a 71 y. o.female that presents with above new performance deficits secondary to influenza with dizziness due to orthostatic hypotension and possible vertigo. Pt is requiring increased assistance for ADLs, functional mobility, ADL transfers compared to baseline level of function. Skilled OT services is warranted to improve above performance deficits and progress pt towards PLOF.  Without OT pt is at risk for falls, further decline in functional abilities, increased caregiver burden, increased risk for medical complication as a result of reduce mobility and inability to return to prior level of living. Performance deficits / Impairments: Decreased functional mobility , Decreased ADL status, Decreased safe awareness, Decreased balance, Decreased high-level IADLs, Decreased strength  Prognosis: Good  REQUIRES OT FOLLOW-UP: Yes  Decision Making: Medium Complexity    Treatment Initiated: Treatment and education initiated within context of evaluation. Evaluation time included review of current medical information, gathering information related to past medical, social and functional history, completion of standardized testing, formal and informal observation of tasks, assessment of data and development of plan of care and goals. Treatment time included skilled education and facilitation of tasks to increase safety and independence with ADL's for improved functional independence and quality of life. Discharge Recommendations:  Home with assist PRN, Home with Home health OT    Patient Education:     Patient Education  Education Given To: Patient  Education Provided: Role of Therapy, Plan of Care  Education Method: Demonstration  Barriers to Learning: None  Education Outcome: Verbalized understanding, Demonstrated understanding    Equipment Recommendations:  Equipment Needed: Yes  Other: Pt has a walker. Discussed importance of using a shower chair in shower instead of sitting in tub for bath secondary to positive orthostatic hypotension and risk for falls. Pt verbalizes understanding. Plan:  Times Per Week: 5x  Times Per Day: Once a day  Current Treatment Recommendations: Strengthening, Balance training, Functional mobility training, Neuromuscular re-education, Self-Care / ADL, Home management training, Safety education & training, Endurance training, Equipment evaluation, education, & procurement. See long-term goal time frame for expected duration of plan of care.   If no long-term goals established, a short length of stay is anticipated. Goals:  Patient goals : To return home  Short Term Goals  Time Frame for Short Term Goals: until discharge  Short Term Goal 1: Pt will safely navigate to/from bathroom with (S) without s/s of drop in BP. Short Term Goal 2: Pt will demo good self monitoring skills of BP upon standing to prevent risk for falls as a result of orthostatic hypotension. Short Term Goal 3: Pt will complete UB/LB bathing/dressing with (S) and good safety awareness. Short Term Goal 4: Pt will tolerate moderate resistive UB HEP with minimal cues for technique to improve UB strength for ADLs. Following session, patient left in safe position with all fall risk precautions in place.

## 2022-12-15 NOTE — PLAN OF CARE
Pt was admitted early this AM (12/15) due to dizziness. Pt was having orthostatic vitals during evaluation. Case discussed with Neuro; plan to obtain CTA Head & neck. Orthostatic VS were positive from laying to sitting up. PT/OT ordered as well.      Electronically signed by PARDEEP Martin on 12/15/2022 at 11:33 AM

## 2022-12-15 NOTE — CARE COORDINATION
Case Management Assessment  Initial Evaluation    Date/Time of Evaluation: 12/15/2022 1:35 PM  Assessment Completed by: Kenneth Em RN    If patient is discharged prior to next notation, then this note serves as note for discharge by case management. Patient Name: Perry Silva                   YOB: 1950  Diagnosis: Dizziness [R42]  Vertigo [R42]  Influenza A [J10.1]  Intractable nausea and vomiting [R11.2]                   Date / Time: 12/14/2022  9:45 PM  Location: [facility]02/002     Patient Admission Status: Inpatient   Readmission Risk (Low < 19, Mod (19-27), High > 27): Readmission Risk Score: 6.9    Current PCP: REBECA Browning CNP  PCP verified by CM? Yes    Chart Reviewed: Yes      History Provided by: Patient  Patient Orientation: Alert and Oriented    Patient Cognition: Alert    Hospitalization in the last 30 days (Readmission):  No    If yes, Readmission Assessment in CM Navigator will be completed. Advance Directives:      Code Status: Full Code   Patient's Primary Decision Maker is: Legal Next of Kin      Discharge Planning:    Patient lives with: Other (Comment) (grandson)   Type of Home: House  Primary Care Giver: Self  Patient Support Systems include: Children, Family Members   Current Financial resources: Medicare  Current community resources: Other (Comment) (no none)  Current services prior to admission: None  Current DME:  uses none, has RW  Type of Home Care services:  None    ADLS  Prior functional level: Independent in ADLs/IADLs  Current functional level: Independent in ADLs/IADLs    Family can provide assistance at DC: Yes  Would you like Case Management to discuss the discharge plan with any other family members/significant others, and if so, who?  No  Plans to Return to Present Housing: Yes  Other Identified Issues/Barriers to RETURNING to current housing: none  Potential Assistance needed at discharge: N/A  Potential DME:  none/ independent  Patient expects to discharge to: House  Plan for transportation at discharge: Family    Financial    Payor: Raudel Contreras / Plan: [facility] ESSENTIAL/PLUS / Product Type: *No Product type* /     Does insurance require precert for SNF: Yes    Potential assistance Purchasing Medications:    Meds-to-Beds request:        [facility] [id number] Destiny Pandey, [address] [phone] Windy Cook [phone]  [facility] [address]  Phone: [phone] Fax: [phone]      Notes:    Factors facilitating achievement of predicted outcomes: Family support, Motivated, Cooperative, and Good insight into deficits    Barriers to discharge: Limited safety awareness and dizzy with vertigo    Additional Case Management Notes: Direct admit from [facility], dizziness, vertigo. + for Flu A, nausea control, up with help. Neurology consulted. Procedure:   CT head:  No acute intracranial process. Minimal changes of chronic microvascular ischemic disease. CTA head and MRI brain pending  The Plan for Transition of Care is related to the following treatment goals of Dizziness [R42]  Vertigo [R42]  Influenza A [J10.1]  Intractable nausea and vomiting [R11.2]    Patient Goals/Plan/Treatment Preferences: Spoke with Ira Yolanda earlier today; she resides home in [place] and her grandson lives with her. He works 2nd shift and she rarely see him, she works part-time, has PCP, insurance confirmed, and declined [facility] or needs. Transportation/Food Security/Housekeeping Addressed: No issues identified.      Marbella Merchant RN  Case Management Department

## 2022-12-15 NOTE — ED NOTES
Patient taken to the bathroom and back to bed. Patient very unsteady in walking to bathroom. Patient found to having stool in her underwear. Stool cleaned off of patient, patient given clean panties and pad. Patient assisted back to bed. Patient reported too dizzy. Dr. Bella Van to bedside. Patient requesting to be admitted to [facility]. Call placed, no beds available they are holding people in ER. Patient stated she would go to [address] [place] [address] Dr. Edward's. Dr. Bella Van made aware.      Case Weller RN  12/15/22 0802

## 2022-12-15 NOTE — PROGRESS NOTES
[address] [place] [place] [address]  INPATIENT PHYSICAL THERAPY  EVALUATION  [facility] ORTHOPEDICS 7K - 7K-02/002-A    Time In: 3383  Time Out: 0935  Timed Code Treatment Minutes: 23 Minutes  Minutes: 32          Date: 12/15/2022  Patient Name: Kranthi Ryder,  Gender:  female        MRN: [id number]  : 1950  (71 y.o.)      Referring Practitioner: Williams Noble PA-C  Diagnosis: dizziness  Additional Pertinent Hx: Per H&P 12/15: Patient transferred from [facility] for further evaluation of vertigo. The patient reports that one day prior to admission she started having \"dizziness\" that she best describes as the room spinning. The patient denies any focal deficits. She reports some changes in her vision - difficulty focusing. She did have a fall at home and struck her left side. She also complains of a headache. Patient is admitted for further evaluation and stroke rule out. MRI brain, and CTA of head and neck ordered. CT of head negative for acute abnormality. She presents with influenza A. Restrictions/Precautions:  Restrictions/Precautions: Isolation, Fall Risk, General Precautions  Position Activity Restriction  Other position/activity restrictions: droplet precautions- flu, monitor orthos    Subjective:  Chart Reviewed: Yes  Patient assessed for rehabilitation services?: Yes  Family / Caregiver Present: No  Subjective: RN approved session. Pt pleasantly agrees. Pt planning to return home and eventually to work. Increased time this session following mobility assessment to discuss PT recommendatoins for use of RW, increased assist and continued PT    General:  Overall Orientation Status: Within Functional Limits  Vision: Within Functional Limits  Hearing: Within functional limits     Pain: L arm hurting from the fall. Unrated     Vitals: Blood Pressure: 146/68 following standing  Heart Rate: 89 bpm    Social/Functional History:    Lives With:  (grandson who is 29.   He works at [employer] in [place])  Type of Home: House  Home Layout: One level  Home Access: Stairs to enter with rails  Entrance Stairs - Number of Steps: 3  Entrance Stairs - Rails: Left  Home Equipment: Walker, rolling     Bathroom Shower/Tub: Tub/Shower unit       ADL Assistance: Independent  Homemaking Assistance: Independent  Homemaking Responsibilities: Yes  Ambulation Assistance: Independent  Transfer Assistance: Independent    Active : Yes  Occupation: Part time employment  Type of Occupation: works at [employer] 2x a week  Additional Comments: she is normally indep with mobility. 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 She has assit with cleaning at home. Her grandson works outside of the home second shift. OBJECTIVE:  Range of Motion:  B Lower Extremity: WFL      Strength:  Right Lower Extremity: WFL 4+/5 all all joints   Left Lower Extremity: WFL Hip flexion 4/5, knee extension 4+/5, knee flexion 4+/5, DF 4+/5, PF 4+/5    Balance:  Static Sitting Balance:  Stand By Assistance, Contact Guard Assistance  Dynamic Sitting Balance: Stand By Assistance, Contact Guard Assistance  Static Standing Balance: Minimal Assistance  Unable to test Tinetti, however, pt required min A for stability as she was noted to have posterior sway with NBOS.  Pt likely has a high fall risk     Pt stood statically for ~ 2 minutes with uni UE support with CGA to min A for stability     Bed Mobility:  Supine to Sit: Stand By Assistance, with head of bed raised, with rail  *HOB~ 35degrees  Sat EOB for ~4 minutes to allow c/o lightheadedness to subside     Transfers:  Sit to Stand: Minimal Assistance  Stand to [address] A for force production to stand to support slow lowering to the chair   Stood statically for ~3 minutes prior to mobility to the chair to take BP and allow lightheadedness to subside     Ambulation:  Minimal Assistance, with cues for safety, with increased time for completion  Distance: 5'  Surface: Level Tile  Device:Hand-Held Assist  Gait Deviations: Forward Flexed Posture, Slow Liane, Decreased Step Length Bilaterally, Decreased Heel Strike Bilaterally, [address] of Support, and Unsteady Gait  Min A required to support postural sway and unsteadiness with mobility to the chair. Pt open to use of RW with future ambulation trials     Seated rest required following ambulation as pt noted to have increased nausea and dizziness, improved with seated rest     Exercise:none    Functional Outcome Measures: Completed  AM-PAC Inpatient Mobility without Stair Climbing Raw Score : 15  AM-PAC Inpatient without Stair Climbing T-Scale Score : 43.03    ASSESSMENT:  Activity Tolerance:  Patient tolerance of  treatment: fair. Pt required several rest breaks to allow lightheadedness to improve. Pt with unsteadiness and likely a high fall risk, she required min A with majority of mobility. She would benefit from RW, continued PT and increased assist.       Treatment Initiated: Treatment and education initiated within context of evaluation. Evaluation time included review of current medical information, gathering information related to past medical, social and functional history, completion of standardized testing, formal and informal observation of tasks, assessment of data and development of plan of care and goals. Treatment time included skilled education and facilitation of tasks to increase safety and independence with functional mobility for improved independence and quality of life. Assessment: Body Structures, Functions, Activity Limitations Requiring Skilled Therapeutic Intervention: Decreased functional mobility , Decreased strength, Decreased safe awareness, Decreased endurance, Decreased balance  Assessment: This patient is a 71 y.o. who presents with dizziness. This is a decline from the patient's baseline status of living with her grandson, indep with mobility and working part time.  The patient is observed to have deficits in strength, balance, activity tolerance, and safety awareness and would benefit from skilled PT services to progress functional mobility, safety awareness, and to decrease overall risk of falls. Therapy Prognosis: Good    Requires PT Follow-Up: Yes    Discharge Recommendations:  Discharge Recommendations: Continue to assess pending progress, Home with Home health PT, 24 hour supervision or assist    Patient Education:      . Patient Education  Education Given To: Patient  Education Provided: Role of Therapy, Plan of Care, Transfer Training, Equipment  Education Method: Demonstration, Verbal  Education Outcome: Continued education needed       Equipment Recommendations:  Equipment Needed: No    Plan:  Current Treatment Recommendations: Strengthening, Balance training, Functional mobility training, Transfer training, Gait training, Endurance training, Neuromuscular re-education, Patient/Caregiver education & training, Safety education & training, Home exercise program, Equipment evaluation, education, & procurement, Therapeutic activities  General Plan:  (5-6x GM/N)    Goals:  Patient Goals : to walk straight  Short Term Goals  Time Frame for Short Term Goals: by hospital d/c  Short Term Goal 1: Pt to complete supine <->sit indep for ease with getting in and out of bed  Short Term Goal 2: Pt to complete sit <->stand with RW and S for safe transfers  Short Term Goal 3: Pt to ambulate >=50' with RW and S to progress towards her PLOF  Short Term Goal 4: Pt to ascend/descend 3 steps with uni HR for safe home entry with S  Long Term Goals  Time Frame for Long Term Goals : NA due to short ELOS    Following session, patient left in safe position with all fall risk precautions in place.

## 2022-12-15 NOTE — ED TRIAGE NOTES
Patient related, \"weakness, nausea, diarrhea, started yesterday. I took a covid test and it was negative. I fell down in the bathroom and hit my left arm I don't know if I hit my head. I dont think so. \" Observed the patient unsteady and dry heaving.

## 2022-12-15 NOTE — ED NOTES
Observed patient resp easy able to transfer self from bed to stretcher. Observed patient talking to Elliot Hardin the EMT. Report called to 7K-02 given to [facility].      Rafi Kirk RN  12/15/22 [id number]

## 2022-12-16 ENCOUNTER — APPOINTMENT (OUTPATIENT)
Dept: CT IMAGING | Age: 72
DRG: 066 | End: 2022-12-16
Payer: MEDICARE

## 2022-12-16 ENCOUNTER — APPOINTMENT (OUTPATIENT)
Dept: GENERAL RADIOLOGY | Age: 72
DRG: 066 | End: 2022-12-16
Payer: MEDICARE

## 2022-12-16 PROBLEM — R11.2 INTRACTABLE NAUSEA AND VOMITING: Status: ACTIVE | Noted: 2022-12-16

## 2022-12-16 LAB
AVERAGE GLUCOSE: 162 MG/DL (ref 70–126)
CHOLESTEROL, TOTAL: 196 MG/DL (ref 100–199)
ERYTHROCYTE [DISTWIDTH] IN BLOOD BY AUTOMATED COUNT: 13.3 % (ref 11.5–14.5)
ERYTHROCYTE [DISTWIDTH] IN BLOOD BY AUTOMATED COUNT: 45.2 FL (ref 35–45)
GLUCOSE BLD-MCNC: 107 MG/DL (ref 70–108)
GLUCOSE BLD-MCNC: 109 MG/DL (ref 70–108)
GLUCOSE BLD-MCNC: 132 MG/DL (ref 70–108)
GLUCOSE BLD-MCNC: 134 MG/DL (ref 70–108)
HBA1C MFR BLD: 7.4 % (ref 4.4–6.4)
HCT VFR BLD CALC: 33.5 % (ref 37–47)
HDLC SERPL-MCNC: 36 MG/DL
HEMOGLOBIN: 10.6 GM/DL (ref 12–16)
LDL CHOLESTEROL CALCULATED: 133 MG/DL
MCH RBC QN AUTO: 29.5 PG (ref 26–33)
MCHC RBC AUTO-ENTMCNC: 31.6 GM/DL (ref 32.2–35.5)
MCV RBC AUTO: 93.3 FL (ref 81–99)
PLATELET # BLD: 188 THOU/MM3 (ref 130–400)
PMV BLD AUTO: 9.8 FL (ref 9.4–12.4)
RBC # BLD: 3.59 MILL/MM3 (ref 4.2–5.4)
TRIGL SERPL-MCNC: 133 MG/DL (ref 0–199)
WBC # BLD: 5.1 THOU/MM3 (ref 4.8–10.8)

## 2022-12-16 PROCEDURE — 92523 SPEECH SOUND LANG COMPREHEN: CPT

## 2022-12-16 PROCEDURE — 36415 COLL VENOUS BLD VENIPUNCTURE: CPT

## 2022-12-16 PROCEDURE — 70498 CT ANGIOGRAPHY NECK: CPT

## 2022-12-16 PROCEDURE — 82948 REAGENT STRIP/BLOOD GLUCOSE: CPT

## 2022-12-16 PROCEDURE — 6360000002 HC RX W HCPCS: Performed by: PHYSICIAN ASSISTANT

## 2022-12-16 PROCEDURE — 83036 HEMOGLOBIN GLYCOSYLATED A1C: CPT

## 2022-12-16 PROCEDURE — 70496 CT ANGIOGRAPHY HEAD: CPT

## 2022-12-16 PROCEDURE — 6370000000 HC RX 637 (ALT 250 FOR IP): Performed by: PHYSICIAN ASSISTANT

## 2022-12-16 PROCEDURE — 93306 TTE W/DOPPLER COMPLETE: CPT

## 2022-12-16 PROCEDURE — 97535 SELF CARE MNGMENT TRAINING: CPT

## 2022-12-16 PROCEDURE — 6360000004 HC RX CONTRAST MEDICATION: Performed by: SOCIAL WORKER

## 2022-12-16 PROCEDURE — 92610 EVALUATE SWALLOWING FUNCTION: CPT

## 2022-12-16 PROCEDURE — 73060 X-RAY EXAM OF HUMERUS: CPT

## 2022-12-16 PROCEDURE — 80061 LIPID PANEL: CPT

## 2022-12-16 PROCEDURE — 6370000000 HC RX 637 (ALT 250 FOR IP): Performed by: STUDENT IN AN ORGANIZED HEALTH CARE EDUCATION/TRAINING PROGRAM

## 2022-12-16 PROCEDURE — 1200000003 HC TELEMETRY R&B

## 2022-12-16 PROCEDURE — 85027 COMPLETE CBC AUTOMATED: CPT

## 2022-12-16 PROCEDURE — 99233 SBSQ HOSP IP/OBS HIGH 50: CPT | Performed by: INTERNAL MEDICINE

## 2022-12-16 PROCEDURE — 2580000003 HC RX 258: Performed by: PHYSICIAN ASSISTANT

## 2022-12-16 RX ORDER — HYDROCODONE BITARTRATE AND ACETAMINOPHEN 5; 325 MG/1; MG/1
1 TABLET ORAL EVERY 4 HOURS PRN
Status: DISCONTINUED | OUTPATIENT
Start: 2022-12-16 | End: 2022-12-19 | Stop reason: HOSPADM

## 2022-12-16 RX ORDER — HYDRALAZINE HYDROCHLORIDE 20 MG/ML
10 INJECTION INTRAMUSCULAR; INTRAVENOUS EVERY 6 HOURS PRN
Status: DISCONTINUED | OUTPATIENT
Start: 2022-12-16 | End: 2022-12-17

## 2022-12-16 RX ORDER — CLOPIDOGREL BISULFATE 75 MG/1
75 TABLET ORAL DAILY
Status: DISCONTINUED | OUTPATIENT
Start: 2022-12-16 | End: 2022-12-19 | Stop reason: HOSPADM

## 2022-12-16 RX ORDER — HYDROCODONE BITARTRATE AND ACETAMINOPHEN 5; 325 MG/1; MG/1
2 TABLET ORAL EVERY 4 HOURS PRN
Status: DISCONTINUED | OUTPATIENT
Start: 2022-12-16 | End: 2022-12-19 | Stop reason: HOSPADM

## 2022-12-16 RX ADMIN — SODIUM CHLORIDE: 9 INJECTION, SOLUTION INTRAVENOUS at 23:05

## 2022-12-16 RX ADMIN — ACETAMINOPHEN 650 MG: 325 TABLET ORAL at 09:12

## 2022-12-16 RX ADMIN — ENOXAPARIN SODIUM 40 MG: 100 INJECTION SUBCUTANEOUS at 09:30

## 2022-12-16 RX ADMIN — IOPAMIDOL 80 ML: 755 INJECTION, SOLUTION INTRAVENOUS at 08:12

## 2022-12-16 RX ADMIN — HYDROCODONE BITARTRATE AND ACETAMINOPHEN 1 TABLET: 5; 325 TABLET ORAL at 23:12

## 2022-12-16 RX ADMIN — ATORVASTATIN CALCIUM 80 MG: 80 TABLET, FILM COATED ORAL at 21:29

## 2022-12-16 RX ADMIN — ACETAMINOPHEN 650 MG: 325 TABLET ORAL at 00:59

## 2022-12-16 RX ADMIN — CLOPIDOGREL BISULFATE 75 MG: 75 TABLET ORAL at 12:30

## 2022-12-16 RX ADMIN — ASPIRIN 81 MG: 81 TABLET, COATED ORAL at 12:30

## 2022-12-16 RX ADMIN — SODIUM CHLORIDE: 9 INJECTION, SOLUTION INTRAVENOUS at 09:13

## 2022-12-16 ASSESSMENT — PAIN DESCRIPTION - ORIENTATION
ORIENTATION: ANTERIOR;POSTERIOR
ORIENTATION_2: LEFT

## 2022-12-16 ASSESSMENT — PAIN DESCRIPTION - DESCRIPTORS
DESCRIPTORS: ACHING
DESCRIPTORS_2: ACHING

## 2022-12-16 ASSESSMENT — PAIN DESCRIPTION - LOCATION
LOCATION: HEAD
LOCATION: HEAD
LOCATION_2: ARM

## 2022-12-16 ASSESSMENT — PAIN DESCRIPTION - ONSET: ONSET: ON-GOING

## 2022-12-16 ASSESSMENT — PAIN DESCRIPTION - FREQUENCY: FREQUENCY: CONTINUOUS

## 2022-12-16 ASSESSMENT — PAIN DESCRIPTION - INTENSITY: RATING_2: 0

## 2022-12-16 ASSESSMENT — PAIN SCALES - GENERAL
PAINLEVEL_OUTOF10: 0
PAINLEVEL_OUTOF10: 5
PAINLEVEL_OUTOF10: 2
PAINLEVEL_OUTOF10: 6
PAINLEVEL_OUTOF10: 7
PAINLEVEL_OUTOF10: 6
PAINLEVEL_OUTOF10: 5
PAINLEVEL_OUTOF10: 0

## 2022-12-16 ASSESSMENT — PAIN - FUNCTIONAL ASSESSMENT
PAIN_FUNCTIONAL_ASSESSMENT_SITE2: ACTIVITIES ARE NOT PREVENTED
PAIN_FUNCTIONAL_ASSESSMENT: PREVENTS OR INTERFERES SOME ACTIVE ACTIVITIES AND ADLS

## 2022-12-16 ASSESSMENT — PAIN DESCRIPTION - PAIN TYPE: TYPE: ACUTE PAIN

## 2022-12-16 NOTE — PLAN OF CARE
Problem: Discharge Planning  Goal: Discharge to home or other facility with appropriate resources  Outcome: Progressing  Flowsheets (Taken 12/15/2022 2356)  Discharge to home or other facility with appropriate resources:   Identify barriers to discharge with patient and caregiver   Arrange for needed discharge resources and transportation as appropriate   Identify discharge learning needs (meds, wound care, etc)     Problem: Pain  Goal: Verbalizes/displays adequate comfort level or baseline comfort level  Outcome: Progressing  Flowsheets (Taken 12/15/2022 2356)  Verbalizes/displays adequate comfort level or baseline comfort level:   Encourage patient to monitor pain and request assistance   Assess pain using appropriate pain scale   Administer analgesics based on type and severity of pain and evaluate response   Implement non-pharmacological measures as appropriate and evaluate response  Note: Patient taking pain medication on MAR as needed to control pain. Use of non-pharmacologic pain management including repositioning. Patient pain goal is 2. Problem: Safety - Adult  Goal: Free from fall injury  Outcome: Progressing  Flowsheets (Taken 12/15/2022 2356)  Free From Fall Injury: Instruct family/caregiver on patient safety  Note: Patient up with staff assistance. Able to use call light. Patient has remained free of falls during this shift. Appropriate fall prevention measures in place. Problem: Neurosensory - Adult  Goal: Achieves stable or improved neurological status  Outcome: Progressing  Flowsheets (Taken 12/15/2022 2356)  Achieves stable or improved neurological status:   Assess for and report changes in neurological status   Initiate measures to prevent increased intracranial pressure   Maintain blood pressure and fluid volume within ordered parameters to optimize cerebral perfusion and minimize risk of hemorrhage  Note: On permissive hypertension.      Problem: Musculoskeletal - Adult  Goal: Return mobility to safest level of function  Outcome: Progressing  Flowsheets (Taken 12/15/2022 2356)  Return Mobility to Safest Level of Function:   Assess patient stability and activity tolerance for standing, transferring and ambulating with or without assistive devices   Assist with transfers and ambulation using safe patient handling equipment as needed     Problem: Gastrointestinal - Adult  Goal: Minimal or absence of nausea and vomiting  Outcome: Progressing  Flowsheets (Taken 12/15/2022 2356)  Minimal or absence of nausea and vomiting: Administer IV fluids as ordered to ensure adequate hydration     Problem: Infection - Adult  Goal: Absence of infection at discharge  Outcome: Progressing  Flowsheets (Taken 12/15/2022 2356)  Absence of infection at discharge:   Assess and monitor for signs and symptoms of infection   Monitor lab/diagnostic results   Monitor all insertion sites i.e., indwelling lines, tubes and drains     Problem: Metabolic/Fluid and Electrolytes - Adult  Goal: Electrolytes maintained within normal limits  Outcome: Progressing  Flowsheets (Taken 12/15/2022 2356)  Electrolytes maintained within normal limits: Monitor labs and assess patient for signs and symptoms of electrolyte imbalances     Problem: Chronic Conditions and Co-morbidities  Goal: Patient's chronic conditions and co-morbidity symptoms are monitored and maintained or improved  Outcome: Progressing  Flowsheets (Taken 12/15/2022 2356)  Care Plan - Patient's Chronic Conditions and Co-Morbidity Symptoms are Monitored and Maintained or Improved:   Monitor and assess patient's chronic conditions and comorbid symptoms for stability, deterioration, or improvement   Collaborate with multidisciplinary team to address chronic and comorbid conditions and prevent exacerbation or deterioration     Problem: Skin/Tissue Integrity  Goal: Absence of new skin breakdown  Description: 1. Monitor for areas of redness and/or skin breakdown  2.   Assess vascular access sites hourly  3. Every 4-6 hours minimum:  Change oxygen saturation probe site  4. Every 4-6 hours:  If on nasal continuous positive airway pressure, respiratory therapy assess nares and determine need for appliance change or resting period. Outcome: Progressing  Note: Assess skin every 4 hours. Problem: Safety - Adult  Goal: Isolation precautions  Description: Isolation precautions   by Kun Meredith RN  Outcome: Progressing  Note: Droplet precaution    Care plan reviewed with patient. Patient verbalizes understanding of the plan of care and contribute to goal setting.

## 2022-12-16 NOTE — PROGRESS NOTES
[address] [place] [address]  SPEECH THERAPY  [facility] ORTHOPEDICS 7K  Speech - Language - Cognitive Evaluation + Clinical Swallow Evaluation    SLP Individual Minutes  Time In: 6267  Time Out: 5069  Minutes: 27  Timed Code Treatment Minutes: 0 Minutes     Speech, Language, Cognitive Evaluation: 17  Clinical Swallow Evaluation: 10    Date: 2022  Patient Name: Duglas Jimenez      CSN: [id number]   : 1950  (71 y.o.)  Gender: female   Referring Physician:  Tony Smith PA-C  Diagnosis: Dizziness  Precautions: Fall risk, aspiration risk  History of Present Illness/Injury: Patient admitted to [facility] with above diagnosis; please see physician H&P for full report. Per chart review, \"Patient transferred from [facility] for further evaluation of vertigo. The patient reports that one day prior to admission she started having \"dizziness\" that she best describes as the room spinning. The patient denies any focal deficits. She reports some changes in her vision - difficulty focusing. She also complains of a headache. Patient is admitted for further evaluation and stroke rule out. \"    ST consulted to further evaluate oropharyngeal swallow integrity and cognitive function with implementation of goals/POC as clinically indicated. Past Medical History:   Diagnosis Date    Diabetes mellitus ([address]        Pain: No pain reported. Subjective:  Patient seen with RN Kimberly Rodriguez permission. Patient seen sitting upright in bed upon ST arrival; alert and cooperative throughout evaluation. No family present. SOCIAL HISTORY:   Living Arrangements: Home with grandson (29)  Work History:  Part time at [employer] Level: Associates Degree  Driving Status: Active   Finance Management: Independent  Medication Management: Independent  ADL's: Independent.    Hobbies: Gardening, Sewing, Reading  Vision Status: WFL, wears corrective lenses for reading only  Hearing: WFL  Type of Home: House  Home Layout: One level  Home Access: Stairs to enter with rails  Entrance Stairs - Number of Steps: 3  Entrance Stairs - Rails: Left  Home Equipment: Walker, rolling    SPEECH / VOICE:  Speech and Voice appear to be grossly intact for basic and complex daily communication    LANGUAGE:  Receptive:  Receptive language skills appear to be grossly intact for basic and complex daily communication. Expressive:  Expressive language skills appear to be grossly intact for basic and complex daily communication. COGNITION:  Curry Cognitive Assessment [facility] version 7.1 completed. Patient scored 26/30. Normal is greater than or equal to 26/30.   Orientation: /6 independent  Immediate Recall: 5/5 independent, 5/5 with repetition  Short-Term Recall: 2/5 independent, 2/5  with category cuing, 1/5 with [facility] cuing  Divergent Namin members/60 seconds  Problem Solving: WFL  Reasoning: WFL  Sequencing: WFL  Thought Organization: WFL  Insight: Good  Attention: 3/3 independent  Math Computation: 4/5 independent  Executive Functionin/5 independent    SWALLOWING:    Respiratory Status: Room Air      Behavioral Observation: Alert and Oriented    CRANIAL NERVE ASSESSMENT   CN V (Trigeminal) Closes and Opens Mandible WFL    Rotary Jaw Movement WFL      CN VII (Facial) Cheeks Hold Food out of [place]ci WFL    Opens, Closes/Seals, Protrudes, Retracts Lips WFL    General Appearance WFL    Sensation Not Tested      CN X (Vagus - Pharyngeal) Raises Back of Tongue WFL      CN XI (Accessory) Lifts Soft Palate WFL      CN XII (Hypoglossal) Elevates Tongue Up and Back WFL    Protrusion   WFL    Lateralizes Tongue WFL    Sensation Not Tested      Other Observations Dentition Edentulous    Vocal Quality WFL    Cough WFL     PATIENT WAS EVALUATED USING:  Thin Liquids and Coarse Solids    ORAL PHASE:  WFL    PHARYNGEAL PHASE:  WFL:  Pharyngeal phase appears WFL but cannot rule out pharyngeal phase deficits from a bedside swallowing evaluation alone. SIGNS AND SYMPTOMS OF LARYNGEAL PENETRATION / ASPIRATION:  Immediate Cough    INSTRUMENTAL EVALUATION: Instrumental evaluation not indicated at this time. DIET RECOMMENDATIONS:  Dental Soft/Easy to [employer]  with Thin    STRATEGIES: Full Upright Position, Pulmonary Monitoring, Limit Distractions, and Monitor for Fatigue          RECOMMENDATIONS/ASSESSMENT:  DIAGNOSTIC IMPRESSIONS:    Uozira-Xehluixp-Eqegzoogp Evaluation: Patient presents with cognitive functioning grossly intact evidenced by evaluation results outlined above. Speech and voice appear to be [facility] with no presence of dysarthria, dysphonia, or aphonia; patient intelligible at the conversation level with approximately 100% accuracy. Expressive and receptive language skills grossly intact with no apparent communicative breakdowns. killed ST services recommended at this time to address HIGH level cognitive skilled (memory, executive functioning, attention) d/t high level of independence and plan to return to [facility]. Clinical Swallow Evaluation: Patient presents with oral phase of swallow function that is essentially [facility] with inability to fully discern potential presence of pharyngeal phase deficits without formal instrumentation. All labial/lingual structures intact and appear to be functioning appropriately at bedside. Oral phase highly unremarkable during consumption of hard/textured solids despite patient withOUT placement of dentures with patient demonstrating adequate mastication pattern for textural breakdown, cohesive bolus formation, and manipulation. Thin liquids consumed without overt difficulty and with suspected control/containment of fluid bolus. Patient  with x1 immediate,  dry cough to follow PO  trials of thin  via straw, though suspect unrelated  to swallow function.  NO additional overt s/s aspiration exhibited across all consistencies/trials consumed, certainly not able to exclude pharyngeal phase dysfunction and/or airway invasion events in its entirety at bedside alone. Patient's swallow physiology does appear appropriate to support PO intake without distress with instrumental evaluation not warranted. Recommend dental soft/easy to chew diet with thin liquids d/t lack of denture placement. Post evaluation, patient withOUT respiratory distress upon leaving room; RN Dalia notified re: clinical findings and recommendations from the assessment; verbal receptiveness noted. Rehabilitation Potential: good  Discharge Recommendations: Continue to Assess Pending Progress    EDUCATION:  Learner: Patient  Education:  Reviewed results and recommendations of this evaluation, Reviewed diet and strategies, Reviewed signs, symptoms and risks of aspiration, Reviewed ST goals and Plan of Care, Reviewed recommendations for follow-up, Education Related to Potential Risks and Complications Due to Impairment/Illness/Injury, Education Related to Prevention of Recurrence of Impairment/Illness/Injury, Education Related to [employer] and Wellness, and Home Safety Education  Evaluation of Education: Verbalizes understanding, Needs further instruction, and Family not present    PLAN:  Skilled SLP intervention on acute care 3-5 x per week or until goals met and/or pt plateaus in function. Specific interventions for next session may include: cognitive therapy. PATIENT GOAL:    Return to prior level of function. SHORT TERM GOALS:  Short Term Goals  Time Frame for Short Term Goals: 2 weeks  Goal 1: Patient will safely consume dental soft/easy to chew diet with thin  liquids without overt s/s of penetration/aspiration in order to assist with meeting  nutrition/hydration measures. Goal 2: Patient will complete advanced texture trials with dentures in place as clinically indicated to determine ability to upgrade diet.   Goal 3: Patient will complete complex working and immediate/delayed recall tasks  with 90% accuracy and minimal cuing in order to improve retention of novel information. Goal 4: Patient  will complete complex executive functioning tasks (medications, finances, etc.) with 80% accuracy and minimal cuing in order to improve completion of ADLs/IADLs. Goal 5: Patient will complete sustained, selective, alternating, and divided attention tasks with no more than three errors in three minutes in order to improve completion of IADLs and return to driving. LONG TERM GOALS:  No LTGs established due to short ELOS.       Manual PRICILA Patel, [facility]

## 2022-12-16 NOTE — PROGRESS NOTES
Neurology Progress Note    Date:12/16/2022       Room:[facility]02/002-A  Patient Yen Meadows     YOB: 1950     Age:72 y.o. Chief Complaint:   Chief Complaint   Patient presents with    Emesis    Dizziness    Fall    Diarrhea       Subjective     Nuria Leavitt is a 71 y.o. female with a history of diabetes, HTN who is admitted to [facility] as a direct admission from [facility] where she presented last night due to two days of generalized weakness, nausea, dizziness, diarrhea. Our service was consulted to evaluate for vertigo and vision changes. She reports falling yesterday due to the weakness, but denies hitting her head. She endorses feeling as if the room is spinning around her. Today, she denies any vision changes. She is currently influenza A positive. She denies any events like this ever happening before. She reports \"walking as if she were drunk\" and staggering. She describes her headache as a bifrontal and posterior occipital that is improving today from prior. She denies any confusion at any point over the last two days. She denies vision changes, difficulty speaking or understanding, sensory loss, numbness or tingling, chest pain, SOB, tremors, LOC, focal weakness. She admits to chills, nasal congestion, cough, generalized weakness and fatigue. She reports improvement of nausea and diarrhea, although she has decreased appetite. She denies personal history of stroke, but reports her father and one of her grandparents had a stroke. She was started on ASA 81 mg, Lipitor 80 mg and meclizine 25 mg PRN overnight. She is also on IVF since admission. Of note, patient is a current participant in a clinical trial for Alzheimer disease. She also reports nausea correlating to dizziness at times, but not all of the time. CT head from outside facility negative for acute findings. Interval history 12/16/22: The patient continues to have vertigo. However, she states it is improved today.   No other neurologic complaints at this time. Review of Systems   Review of Systems   Eyes:  Negative for visual disturbance. Neurological:  Positive for dizziness. Negative for facial asymmetry, speech difficulty, weakness, light-headedness, numbness and headaches. Medications   Scheduled Meds:    clopidogrel  75 mg Oral Daily    enoxaparin  40 mg SubCUTAneous Daily    aspirin  81 mg Oral Daily    Or    aspirin  300 mg Rectal Daily    atorvastatin  80 mg Oral Nightly    insulin lispro  0-4 Units SubCUTAneous TID WC    insulin lispro  0-4 Units SubCUTAneous Nightly     Continuous Infusions:    sodium chloride 75 mL/hr at 22 0913    dextrose       PRN Meds: HYDROcodone 5 mg - acetaminophen **OR** HYDROcodone 5 mg - acetaminophen, ondansetron **OR** ondansetron, polyethylene glycol, acetaminophen **OR** acetaminophen, glucose, dextrose bolus **OR** dextrose bolus, glucagon (rDNA), dextrose  Medications Prior to Admission:   No current facility-administered medications on file prior to encounter. Current Outpatient Medications on File Prior to Encounter   Medication Sig Dispense Refill    metFORMIN (GLUCOPHAGE) 500 MG tablet Take 250 mg by mouth 2 times daily (with meals)      Naproxen (NAPROSYN PO) Take by mouth as needed (for pain)       Past History    Past Medical History:   has a past medical history of Diabetes mellitus ([facility] [address] Social History:   reports that she has never smoked. She has never been exposed to tobacco smoke. She has never used smokeless tobacco. She reports that she does not drink alcohol and does not use drugs. Family History: History reviewed. No pertinent family history. Physical Examination      Vitals:  BP (!) 168/70   Pulse 65   Temp 98.3 °F (36.8 °C) (Oral)   Resp 16   Ht 5' 3.5\" (1.613 m)   Wt 165 lb 9.1 oz (75.1 kg)   SpO2 98%   BMI 28.87 kg/m²   Temp (24hrs), Av °F (36.7 °C), Min:97.9 °F (36.6 °C), Max:98.3 °F (36.8 °C)      I/O (24Hr):     Intake/Output Summary (Last 24 hours) at 12/16/2022 1701  Last data filed at 12/16/2022 1405  Gross per 24 hour   Intake 1170 ml   Output --   Net 1170 ml         Physical Exam  Vitals reviewed. Constitutional:       General: She is not in acute distress. Appearance: Normal appearance. She is not ill-appearing. HENT:      Head: Normocephalic and atraumatic. Right Ear: External ear normal.      Left Ear: External ear normal.      Nose: Nose normal.      Mouth/Throat:      Mouth: Mucous membranes are dry. Pharynx: No oropharyngeal exudate or posterior oropharyngeal erythema. Eyes:      Extraocular Movements: Extraocular movements intact and EOM normal.      Pupils: Pupils are equal, round, and reactive to light. Musculoskeletal:         General: Normal range of motion. Right lower leg: No edema. Left lower leg: No edema. Skin:     General: Skin is warm. Findings: No rash. Neurological:      Mental Status: She is alert and oriented to person, place, and time. Coordination: Finger-Nose-Finger Test and Heel to [employer] normal.   Psychiatric:         Mood and Affect: Mood normal.         Speech: Speech normal.         Behavior: Behavior normal.     Neurologic Exam     Mental Status   Oriented to person, place, and time. Attention: normal. Concentration: normal.   Speech: speech is normal   Level of consciousness: alert  Normal comprehension. Cranial Nerves     CN II   Visual fields full to confrontation. Right visual field deficit: none  Left visual field deficit: none     CN III, IV, VI   Pupils are equal, round, and reactive to light. Extraocular motions are normal.   Nystagmus: bilateral   Nystagmus type: horizontal    CN V   Facial sensation intact. Right facial sensation deficit: none  Left facial sensation deficit: none    CN VII   Facial expression full, symmetric.    Right facial weakness: none  Left facial weakness: none    CN VIII   CN VIII normal.   Hearing: intact    CN IX, X   CN IX normal.   CN X normal.   Palate: symmetric    CN XI   CN XI normal.   Right sternocleidomastoid strength: normal  Left sternocleidomastoid strength: normal  Right trapezius strength: normal  Left trapezius strength: normal    CN XII   CN XII normal.   Tongue: not atrophic  Fasciculations: absent  Tongue deviation: none    Motor Exam   Muscle bulk: normal  Overall muscle tone: normal  Right arm pronator drift: absent  Left arm pronator drift: absent  Muscle strength 5/5 bilateral upper extremities, 4+/5 bilateral lower extremities. Sensory Exam   Decreased sensation to light touch in right lower extremity. Sensation to light touch equal in bilateral upper extremities. Gait, Coordination, and Reflexes     Coordination   Finger to nose coordination: normal  Heel to shin coordination: normal     Labs/Imaging/Diagnostics   Labs:  CBC:  Recent Labs     12/14/22 2221 12/16/22  0642   WBC 8.8 5.1   RBC 4.03* 3.59*   HGB 11.8* 10.6*   HCT 35.3* 33.5*   MCV 87.6 93.3   RDW 13.0  --     188       CHEMISTRIES:  Recent Labs     12/14/22  2221 12/15/22  0756    140   K 3.8 4.0    105   CO2 25 24   BUN 19* 20   CREATININE 1.3 1.2   GLUCOSE 204* 153*       COAGULATION STUDIES:No results for input(s): PROTIME, INR, APTT in the last 72 hours. LIVER PROFILE:  Recent Labs     12/14/22 2221   AST 26   ALT 32   BILITOT 0.5   ALKPHOS 140*       CHOLESTEROL AND A1C:  Recent Labs     12/16/22  0642   LDLCALC 133   HDL 36   CHOL 196   TRIG 133   LABA1C 7.4*        Imaging Last 24 Hours:  CTA HEAD W WO CONTRAST    Result Date: 12/16/2022  PROCEDURE: CTA HEAD W WO CONTRAST, CTA NECK W WO CONTRAST CLINICAL INFORMATION: dizziness, vertigo, vision changes, headaches. Recent fall. COMPARISON: Brain MRI 12/15/2022. TECHNIQUE: 1 mm axial images were obtained through the head and neck after the fast bolus administration of contrast. A noncontrast localizer was obtained.  3-D reconstructions were performed on a dedicated 3-D workstation. These include multiplanar MPR images and multiplanar MIP images. Centerline reconstructions were obtained of the carotid systems. Isovue intravenous contrast was given. All CT scans at this facility use dose modulation, iterative reconstruction, and/or weight-based dosing when appropriate to reduce radiation dose to as low as reasonably achievable. FINDINGS: CTA NECK: Aortic arch and branches: Aortic arch is within acceptable limits. There is some mild atherosclerosis of the proximal descending thoracic aorta. There is no stenosis of the common origin of innominate artery left common carotid artery. There is no stenosis at the origin of either subclavian artery. Right common carotid artery/ICA: The right common carotid artery is normal. The right carotid bulb is normal. The right internal carotid artery is normal. There is no atherosclerosis. There is no stenosis. Left common carotid artery/ICA: The left common carotid artery is normal. The left carotid bulb is normal. The left internal carotid artery is normal. There is no atherosclerosis. There is no stenosis. Vertebral arteries: The vertebral arteries are normal bilaterally. The left vertebral artery is dominant. CTA HEAD: Internal carotid arteries: Normal. The ophthalmic artery origins are also normal. Middle cerebral arteries: Normal. The proximal branches are also normal. Anterior cerebral arteries: Normal. The proximal branches are normal. There is a normal small anterior communicating artery. Vertebral arteries: The vertebral arteries are normal. Basilar artery: Normal. Superior cerebellar arteries: Normal. Posterior cerebral arteries: There are hypoplastic P1 segments bilaterally. There are normal bilateral moderate-sized posterior to indicating arteries. There are bilateral normal P2 segments. No aneurysms, stenoses or occlusions are noted.  The superior sagittal sinus, vein of Anselmo, internal cerebral veins, straight sinus, transverse sinuses and sigmoid sinuses are patent. Axial source data: There are no suspicious findings in the lung apices. There is no cervical adenopathy. There are no gross abnormalities in the brain. There is some mucosal thickening in the ethmoid air cells. There are degenerative changes the cervical  spine. Normal CTA of the head and neck. **This report has been created using voice recognition software. It may contain minor errors which are inherent in voice recognition technology. ** Final report electronically signed by Dr. Shelly Blankenship on 12/16/2022 10:37 AM    XR HUMERUS LEFT (MIN 2 VIEWS)    Result Date: 12/16/2022  PROCEDURE: XR HUMERUS LEFT (MIN 2 VIEWS) CLINICAL INFORMATION: Fall. Left arm pain. COMPARISON: No prior study. TECHNIQUE: AP and lateral views of the right humerus. FINDINGS: No fractures identified. Diffuse osteopenia is observed. Sclerosis is noted in the humeral head at the rotator cuff insertion suggesting underlying rotator cuff pathology. Glenohumeral and acromioclavicular joint space narrowing and marginal spurring is observed. The visualized soft tissues are unremarkable. No fracture or acute bony abnormality visualized. Chronic degenerative changes are noted. **This report has been created using voice recognition software. It may contain minor errors which are inherent in voice recognition technology. ** Final report electronically signed by Dr Irais Eaton on 12/16/2022 2:30 PM    CT HEAD WO CONTRAST    Result Date: 12/15/2022  CT head without contrast Comparison: MRI brain November 22, 2022 Findings: No intracranial mass, midline shift, hydrocephalus, or acute hemorrhage. Minimal changes of chronic microvascular ischemic disease. Mild mucosal thickening of the ethmoid sinuses. The orbits are unremarkable. No skull fracture. 1. No acute intracranial process. 2. Minimal changes of chronic microvascular ischemic disease.  This document has been artery origins are also normal. Middle cerebral arteries: Normal. The proximal branches are also normal. Anterior cerebral arteries: Normal. The proximal branches are normal. There is a normal small anterior communicating artery. Vertebral arteries: The vertebral arteries are normal. Basilar artery: Normal. Superior cerebellar arteries: Normal. Posterior cerebral arteries: There are hypoplastic P1 segments bilaterally. There are normal bilateral moderate-sized posterior to indicating arteries. There are bilateral normal P2 segments. No aneurysms, stenoses or occlusions are noted. The superior sagittal sinus, vein of Anselmo, internal cerebral veins, straight sinus, transverse sinuses and sigmoid sinuses are patent. Axial source data: There are no suspicious findings in the lung apices. There is no cervical adenopathy. There are no gross abnormalities in the brain. There is some mucosal thickening in the ethmoid air cells. There are degenerative changes the cervical  spine. Normal CTA of the head and neck. **This report has been created using voice recognition software. It may contain minor errors which are inherent in voice recognition technology. ** Final report electronically signed by Dr. Qi Acosta on 12/16/2022 10:37 AM    MRI brain without contrast    Result Date: 12/15/2022   ADDENDUM #1  This report was discussed with Mary Vidal RN on Dec 15, 2022 20:41:00 EST. This document has been electronically signed by: Amparo Horton on 12/15/2022 08:41 PM  ORIGINAL REPORT  Exam: MRI of the brain without contrast Comparison: 12/15/2022, 11/22/2022 Clinical history: Dizziness, vertigo, influenza A, intractable nausea and vomiting. Findings: Very small acute infarct seen at the anterior left temporal lobe on series 2 image 10. Very small acute infarct seen at the left cerebellar peduncle on series 2 image 7 The ventricles and sulci are prominent in size consistent with age-related volume loss.  No intracranial masses are identified. No midline shift. No acute intracranial hemorrhage. No abnormal extra-axial fluid collections are seen. Few small areas of increased FLAIR signal within the cerebral white matter likely reflective of ischemic microvascular disease     Impression: 1. Two very small acute infarcts; one is at the anterior left temporal lobe and the other is in the left cerebellar peduncle. 2. No acute intracranial hemorrhage. 3. Volume loss with cerebral white matter disease, likely ischemic microvascular in nature. This document has been electronically signed by: Dorcas Gallagher MD on 12/15/2022 08:37 PM      Assessment and Plan:        1. Acute infarcts in left anterior temporal lobe and left cerebellar peduncle concerning for cardioembolic etiology. 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 Imaging  CT revealed no acute findings  CTA of head and neck revealed no significant stenosis. No need for carotid ultrasound. MRI of brain without contrast revealed acute infarction left anterior temporal lobe and left cerebellar peduncle  2D echocardiogram ordered. Stat CT head is needed if the patient develops new-onset altered mental status, a severe headache, or new-onset neurologic deficit  Risk factors and medications  Blood pressure goal: less than 130/80. Keep well hydrated. Initiate normal saline at 75 ml/hr as needed. Antithrombotics: aspirin 81 mg daily and Plavix 75 mg daily for 21 days. Then aspirin 81 mg daily indefinitely  HgbA1C 7.4. If the patient has diabetes, recommend tight control of A1c with goal of <7.0 if attainable. . LDL goal of 45-70. Continue Lipitor 80 mg  Smoking and alcohol cessation when applicable. Provide stroke eduction for individualized risk factors. EKG/telemetry to monitor for atrial fibrillation  Core stroke metrics  Dysphagia screen prior to oral intake  PT/OT/SLP consult. IPR consult if applicable. DVT prophylaxis: Lovenox  NIHSS every shift.  Neuro checks per unit unless otherwise specified. Pre-morbid Modified [place] Scale: 0 - No symptoms at all. Avoid vestibular suppressants if at all possible. Cardiac event monitor upon discharge. Consider inpatient rehab consultation. No further inpatient work-up from neurologic standpoint. Neurology will sign off pending results of echocardiogram.  Please call with any questions or concerns. The patient can follow-up with outpatient general neurology in approximately 1 month upon discharge.     Electronically signed by Johnny Medrano PA-C on 12/16/22 at 5:08 PM EST

## 2022-12-16 NOTE — CARE COORDINATION
12/16/22, 11:42 AM EST    DISCHARGE ON GOING EVALUATION    Severo Satchel day: 1  Location: [facility]02/002-A Reason for admit: Dizziness [R42]  Vertigo [R42]  Influenza A [J10.1]  Intractable nausea and vomiting [R11.2]   Procedure:   CT head:  No acute intracranial process. Minimal changes of chronic microvascular ischemic disease. CTA head and neck: normal     MRI brain:  Two very small acute infarcts; one is at the anterior left temporal    lobe and the other is in the left cerebellar peduncle. No acute intracranial hemorrhage. Volume loss with cerebral white matter disease, likely ischemic    microvascular in nature. Barriers to Discharge: neuro follows, added Plavix, PT/OT. PCP: Valentino Nicks , APRN - CNP  Readmission Risk Score: 7.7%  Patient Goals/Plan/Treatment Preferences: plans home with grandson; declined [facility] or needs. Patient is a current participant in a clinical trial for Alzheimer disease. Possible weekend discharge:      12/16/22, 11:46 AM EST    Patient goals/plan/ treatment preferences discussed by  and . Patient goals/plan/ treatment preferences reviewed with patient/ family. Patient/ family verbalize understanding of discharge plan and are in agreement with goal/plan/treatment preferences. Understanding was demonstrated using the teach back method. AVS provided by RN at time of discharge, which includes all necessary medical information pertaining to the patients current course of illness, treatment, post-discharge goals of care, and treatment preferences. Services At/After Discharge: None       IMM Letter  IMM Letter given to Patient/Family/Significant other/Guardian/POA/by[de-identified] CM:  Quincy Santiago RN  IMM Letter date given[de-identified] 12/16/22  IMM Letter time given[de-identified] [id number]

## 2022-12-16 NOTE — PROGRESS NOTES
[facility]  Occupational Therapy  Daily Note  Time:   Time In: 1430  Time Out: 1500  Timed Code Treatment Minutes: 30 Minutes  Minutes: 30          Date: 2022  Patient Name: Edwina Fleischer,   Gender: female      Room: [facility]002-A  MRN: [id number]  : 1950  (71 y.o.)  Referring Practitioner: Angel Zaldivar PA-C  Diagnosis: vertigo  Additional Pertinent Hx: Patient transferred from [facility] for further evaluation of vertigo. The patient reports that Tuesday prior to admission she started having \"dizziness\" that she best describes as the room spinning. Later  night pt was taking a bath and when she got out of the tub she felt lightheaded and fell. She reports some changes in her vision - difficulty focusing. She also complains of a headache. DX: vertigo vs. orthostatic hypotension. MRI ordered to rule of CVA    Restrictions/Precautions:  Restrictions/Precautions: Isolation, Fall Risk, General Precautions  Position Activity Restriction  Other position/activity restrictions: droplet precautions- flu, monitor orthos     SUBJECTIVE: RN okayed OT session. Pt. In room agreeable to participate in OT session. PAIN: L humerus-not rated reports taking tylenol for relief    Vitals: Orthostatic Blood Pressure: Supine: 161/86, Sittin/83, Standin/87  Oxygen: 97% RA  Heart Rate: 106    COGNITION: WFL    ADL:   Grooming: Stand By Assistance and [employer]. To stand at sink and complete handwashing to wash face and to complete oral mouth wash  Lower Extremity Dressing: Stand By Assistance. To don and doff socks figure four style  Toileting: [employer]. Toilet Transfer: Contact Guard Assistance. Valente Sarabia BALANCE:  Sitting Balance:  Stand By Assistance. Seated on EOB  Standing Balance: Contact Guard Assistance.  No Lob assistance with OV pole    BED MOBILITY:  Supine to Sit: Stand By Assistance    Scooting: Stand By Assistance to scoot to EOB    TRANSFERS:  Sit to Stand:  Contact Guard Assistance. From EOB and toilet  Stand to Sit: [employer]. To EOB, toilet, and recliner    FUNCTIONAL MOBILITY:  Assistive Device: Rolling Walker  Assist Level:  Contact Guard Assistance. Distance: To and from bathroom and within room      ASSESSMENT:     Activity Tolerance:  Patient tolerance of  treatment: fair. Discharge Recommendations: Home with assist as needed and Home with Home Health OT  Equipment Recommendations: Equipment Needed: Yes  Other: Pt has a walker. Discussed importance of using a shower chair in shower instead of sitting in tub for bath secondary to positive orthostatic hypotension adn risk for falls. Pt verbalizes understanding. Plan: Times Per Week: 5x  Times Per Day: Once a day  Current Treatment Recommendations: Strengthening, Balance training, Functional mobility training, Neuromuscular re-education, Self-Care / ADL, Home management training, Safety education & training, Endurance training, Equipment evaluation, education, & procurement    Patient Education  Patient Education: ADL's and Assistive Device Safety    Goals  Short Term Goals  Time Frame for Short Term Goals: until discharge  Short Term Goal 1: Pt will safely navigate to/from bathroom with (S) without s/s of drop in BP. Short Term Goal 2: Pt will demo good self monitoring skills of BP upon standing to prevent risk for falls as a result of orthostatic hypotension. Short Term Goal 3: Pt will complete UB/LB bathing/dressing with (S) and good safety awareness. Short Term Goal 4: Pt will tolerate moderate resistive UB HEP with minimal cues for technique to improve UB strength for ADLs. Following session, patient left in safe position with all fall risk precautions in place.

## 2022-12-17 LAB
ABSOLUTE RETIC #: 57 THOU/MM3 (ref 20–115)
ANION GAP SERPL CALCULATED.3IONS-SCNC: 8 MEQ/L (ref 8–16)
BASOPHILS # BLD: 0.4 %
BASOPHILS ABSOLUTE: 0 THOU/MM3 (ref 0–0.1)
BUN BLDV-MCNC: 18 MG/DL (ref 7–22)
CALCIUM SERPL-MCNC: 8.3 MG/DL (ref 8.5–10.5)
CHLORIDE BLD-SCNC: 106 MEQ/L (ref 98–111)
CO2: 27 MEQ/L (ref 23–33)
CREAT SERPL-MCNC: 1.2 MG/DL (ref 0.4–1.2)
EOSINOPHIL # BLD: 3.3 %
EOSINOPHILS ABSOLUTE: 0.2 THOU/MM3 (ref 0–0.4)
ERYTHROCYTE [DISTWIDTH] IN BLOOD BY AUTOMATED COUNT: 13.3 % (ref 11.5–14.5)
ERYTHROCYTE [DISTWIDTH] IN BLOOD BY AUTOMATED COUNT: 44.2 FL (ref 35–45)
FERRITIN: 280 NG/ML (ref 10–291)
FOLATE: 14.6 NG/ML (ref 4.8–24.2)
GFR SERPL CREATININE-BSD FRML MDRD: 48 ML/MIN/1.73M2
GLUCOSE BLD-MCNC: 106 MG/DL (ref 70–108)
GLUCOSE BLD-MCNC: 120 MG/DL (ref 70–108)
GLUCOSE BLD-MCNC: 124 MG/DL (ref 70–108)
GLUCOSE BLD-MCNC: 149 MG/DL (ref 70–108)
GLUCOSE BLD-MCNC: 97 MG/DL (ref 70–108)
HCT VFR BLD CALC: 33.6 % (ref 37–47)
HEMOGLOBIN: 10.9 GM/DL (ref 12–16)
IMMATURE GRANS (ABS): 0.02 THOU/MM3 (ref 0–0.07)
IMMATURE GRANULOCYTES: 0.4 %
IMMATURE RETIC FRACT: 9.1 % (ref 3–15.9)
IRON SATURATION: 22 % (ref 20–50)
IRON: 50 UG/DL (ref 50–170)
LYMPHOCYTES # BLD: 44 %
LYMPHOCYTES ABSOLUTE: 2.1 THOU/MM3 (ref 1–4.8)
MCH RBC QN AUTO: 29.2 PG (ref 26–33)
MCHC RBC AUTO-ENTMCNC: 32.4 GM/DL (ref 32.2–35.5)
MCV RBC AUTO: 90.1 FL (ref 81–99)
MONOCYTES # BLD: 8.5 %
MONOCYTES ABSOLUTE: 0.4 THOU/MM3 (ref 0.4–1.3)
NUCLEATED RED BLOOD CELLS: 0 /100 WBC
PLATELET # BLD: 171 THOU/MM3 (ref 130–400)
PMV BLD AUTO: 9.6 FL (ref 9.4–12.4)
POTASSIUM REFLEX MAGNESIUM: 4 MEQ/L (ref 3.5–5.2)
RBC # BLD: 3.73 MILL/MM3 (ref 4.2–5.4)
RETIC HEMOGLOBIN: 30.5 PG (ref 28.2–35.7)
RETICULOCYTE ABSOLUTE COUNT: 1.5 % (ref 0.5–2)
SEG NEUTROPHILS: 43.4 %
SEGMENTED NEUTROPHILS ABSOLUTE COUNT: 2.1 THOU/MM3 (ref 1.8–7.7)
SODIUM BLD-SCNC: 141 MEQ/L (ref 135–145)
TOTAL IRON BINDING CAPACITY: 231 UG/DL (ref 171–450)
VITAMIN B-12: 557 PG/ML (ref 211–911)
WBC # BLD: 4.8 THOU/MM3 (ref 4.8–10.8)

## 2022-12-17 PROCEDURE — 84075 ASSAY ALKALINE PHOSPHATASE: CPT

## 2022-12-17 PROCEDURE — 99233 SBSQ HOSP IP/OBS HIGH 50: CPT | Performed by: INTERNAL MEDICINE

## 2022-12-17 PROCEDURE — 6370000000 HC RX 637 (ALT 250 FOR IP): Performed by: PHYSICIAN ASSISTANT

## 2022-12-17 PROCEDURE — 85025 COMPLETE CBC W/AUTO DIFF WBC: CPT

## 2022-12-17 PROCEDURE — 82746 ASSAY OF FOLIC ACID SERUM: CPT

## 2022-12-17 PROCEDURE — 84080 ASSAY ALKALINE PHOSPHATASES: CPT

## 2022-12-17 PROCEDURE — 83550 IRON BINDING TEST: CPT

## 2022-12-17 PROCEDURE — 83540 ASSAY OF IRON: CPT

## 2022-12-17 PROCEDURE — 6360000002 HC RX W HCPCS: Performed by: PHYSICIAN ASSISTANT

## 2022-12-17 PROCEDURE — 1200000003 HC TELEMETRY R&B

## 2022-12-17 PROCEDURE — 97110 THERAPEUTIC EXERCISES: CPT

## 2022-12-17 PROCEDURE — 82948 REAGENT STRIP/BLOOD GLUCOSE: CPT

## 2022-12-17 PROCEDURE — 6370000000 HC RX 637 (ALT 250 FOR IP): Performed by: INTERNAL MEDICINE

## 2022-12-17 PROCEDURE — 82728 ASSAY OF FERRITIN: CPT

## 2022-12-17 PROCEDURE — 6370000000 HC RX 637 (ALT 250 FOR IP): Performed by: STUDENT IN AN ORGANIZED HEALTH CARE EDUCATION/TRAINING PROGRAM

## 2022-12-17 PROCEDURE — 80048 BASIC METABOLIC PNL TOTAL CA: CPT

## 2022-12-17 PROCEDURE — 85046 RETICYTE/HGB CONCENTRATE: CPT

## 2022-12-17 PROCEDURE — 36415 COLL VENOUS BLD VENIPUNCTURE: CPT

## 2022-12-17 PROCEDURE — 97535 SELF CARE MNGMENT TRAINING: CPT

## 2022-12-17 PROCEDURE — 82607 VITAMIN B-12: CPT

## 2022-12-17 RX ORDER — LOSARTAN POTASSIUM 100 MG/1
100 TABLET ORAL DAILY
Status: DISCONTINUED | OUTPATIENT
Start: 2022-12-17 | End: 2022-12-19 | Stop reason: HOSPADM

## 2022-12-17 RX ADMIN — ATORVASTATIN CALCIUM 80 MG: 80 TABLET, FILM COATED ORAL at 22:30

## 2022-12-17 RX ADMIN — LOSARTAN POTASSIUM 100 MG: 100 TABLET, FILM COATED ORAL at 10:50

## 2022-12-17 RX ADMIN — CLOPIDOGREL BISULFATE 75 MG: 75 TABLET ORAL at 10:50

## 2022-12-17 RX ADMIN — ASPIRIN 81 MG: 81 TABLET, COATED ORAL at 10:50

## 2022-12-17 RX ADMIN — HYDROCODONE BITARTRATE AND ACETAMINOPHEN 1 TABLET: 5; 325 TABLET ORAL at 05:36

## 2022-12-17 RX ADMIN — ENOXAPARIN SODIUM 40 MG: 100 INJECTION SUBCUTANEOUS at 10:50

## 2022-12-17 ASSESSMENT — PAIN SCALES - GENERAL
PAINLEVEL_OUTOF10: 3
PAINLEVEL_OUTOF10: 7
PAINLEVEL_OUTOF10: 0

## 2022-12-17 NOTE — PROGRESS NOTES
[facility]  Occupational Therapy  Daily Note  Time:   Time In:   Time Out:   Timed Code Treatment Minutes: 15 Minutes  Minutes: 15          Date: 2022  Patient Name: Candie Gasca,   Gender: female      Room: 7-02/002-A  MRN: [id number]  : 1950  (71 y.o.)  Referring Practitioner: Jackie Ramos PA-C  Diagnosis: vertigo  Additional Pertinent Hx: Patient transferred from [facility] for further evaluation of vertigo. The patient reports that Tuesday prior to admission she started having \"dizziness\" that she best describes as the room spinning. Later  night pt was taking a bath and when she got out of the tub she felt lightheaded and fell. She reports some changes in her vision - difficulty focusing. She also complains of a headache. DX: vertigo vs. orthostatic hypotension. MRI ordered to rule of CVA    Restrictions/Precautions:  Restrictions/Precautions: Isolation, Fall Risk, General Precautions  Position Activity Restriction  Other position/activity restrictions: droplet precautions- flu, monitor orthos     SUBJECTIVE: Pleasant and cooperative. Talkative. RN approved session. Pt had asked to go to the bathroom. PAIN: 7/10: L arm proximal to elbow. Tingling cramp reported in her L thigh anteriorly. Vitals: Vitals not assessed per clinical judgement, see nursing flowsheet    COGNITION: Tangential    ADL:   Grooming: Stand By Assistance. Washing her hands at the sink and rinsing her mouth with mouthwash  Toileting: Modified Independent. Pt had no difficulty with clothing management and with wiping herself after having voided. Toilet Transfer: Modified Independent. To/from the standard toilet seat using a grabbar . BALANCE:  Sitting Balance:  Supervision. Scooting at the edge of bed  Standing Balance: [employer]. When first standing up from the bed.     BED MOBILITY:  Rolling to Right: Stand By Assistance using the bedrail with head of bed elevated  Supine to Sit: Stand By Assistance using a bedrail with head of bed elevated  Sit to Supine: Stand By Assistance, with head of bed flat, with rail    Scooting: Stand By Assistance, with rail      TRANSFERS:  Sit to Stand:  Stand By Assistance. From the edge of bed  Stand to Sit: Stand By Assistance. To the edge of bed    FUNCTIONAL MOBILITY:  Assistive Device: Rolling Walker  Assist Level:  Stand By Assistance. Distance: To and from bathroom  Pt had some unsteadiness but no LOB. ADDITIONAL ACTIVITIES:  Reviewed with pt importance of staying hydrated and pausing after having sat up or after having stood to allow her blood pressure time to adjust to the change in position. Pt states that she had vomiting and diarrhea for at least a day prior to the admission. She agreed that she was told that she had been dehydrated when she was admitted. A bruise was noted over posterior L elbow. Pt completed LUE ROM exercises with prolonged stretch of her biceps muscle: elbow extended while shoulder in hyperextension- pt not able to tolerate full elbow extension; pt also had a long stretch in shoulder flexion and elbow flexion while resting her hand on her head. Tolerated fair. Her arm was resting on a pillow following the session. ASSESSMENT:     Activity Tolerance:  Patient tolerance of  treatment: fair. Pt had stood for 3 minutes while finishing her self care at the sink. Discharge Recommendations: Home with assist as needed and Home with Home Health OT  Equipment Recommendations: Equipment Needed: Yes  Other: Pt has a walker. Discussed importance of using a shower chair in shower instead of sitting in tub for bath secondary to positive orthostatic hypotension adn risk for falls. Pt verbalizes understanding. Plan: Times Per Week: 5x  Times Per Day:  Once a day  Current Treatment Recommendations: Strengthening, Balance training, Functional mobility training, Neuromuscular re-education, Self-Care / ADL, Home management training, Safety education & training, Endurance training, Equipment evaluation, education, & procurement    Patient Education  Patient Education: Home Exercise Program, Reviewed Prior Education, and Home Safety    Goals  Short Term Goals  Time Frame for Short Term Goals: until discharge  Short Term Goal 1: Pt will safely navigate to/from bathroom with (S) without s/s of drop in BP. Short Term Goal 2: Pt will demo good self monitoring skills of BP upon standing to prevent risk for falls as a result of orthostatic hypotension. Short Term Goal 3: Pt will complete UB/LB bathing/dressing with (S) and good safety awareness. Short Term Goal 4: Pt will tolerate moderate resistive UB HEP with minimal cues for technique to improve UB strength for ADLs. Following session, patient left in safe position with all fall risk precautions in place.

## 2022-12-17 NOTE — PROGRESS NOTES
PROGRESS NOTE      Patient:  Edwina Fleischer      Unit/Bed:7K-02/002-A    YOB: 1950    MRN: [id number]       Acct: [de-identified]     PCP: REBECA Soliz CNP    Date of Admission: 12/14/2022      Assessment/Plan:    Anticipated Discharge in : 2-3 days    Active Hospital Problems    Diagnosis Date Noted    Intractable nausea and vomiting [R11.2] 12/16/2022     Priority: Medium    Vertigo [R42] 12/15/2022     Priority: Medium     Acute CVA  Noted on recent MRI  Neurology has been consulted, updated team of MRI  Continue ASA, plavix, lipitor  Pending CTA Head/Neck  Pending echo  A1C resulted as below  Lipid Panel as below  PT/OT has seen patient, to get home health   Has been eating well, pending speech consult       Vertigo:    Did have positive orthostatics   MRI brain showed infarcts as above  Labyrinthitis also possible contributing factor   Meclizine prn    Essential hypertension:   Permissive hypertension for now  Patient states her oral agents were all recently stopped because her blood pressures were normal   Plan for losartan at discharge once cleared by neurology    HLD   On lipitor 80mg currently, has not been on it   ASCVD of 18%   LDL at 133, not at goal    NIDDM II:   Low dose sliding scale  Hypoglycemic treatment orders  ADA diet  7.4 A1C  Should increase metformin on Discharge  Also to begin Jardiance on discharge     Influenza A  Supportive care  On room air  Some nausea from this  PRN zofran and gentle IVF    Left arm Pain   Did fall on arm before admission   PRN tylenol   Obtain humerus Xray    Memory Difficulties   Of note, patient is a current participant in a clinical trial for Alzheimer disease.     Elevated Alk Phos   Noted, will have outpatient followup     Normocytic Anemia   Noted on admission, unclear cause   Will order iron studies for AM        Chief Complaint: Dizziness    Hospital Course:   Per HPI  \" Patient transferred from [facility] [place] for further evaluation of vertigo. The patient reports that one day prior to admission she started having \"dizziness\" that she best describes as the room spinning. The patient denies any focal deficits. She reports some changes in her vision - difficulty focusing. She also complains of a headache. Patient is admitted for further evaluation and stroke rule out. \"    Since admitted, MRI did confirm CVA. Neurology consulted. Pending CTA and echo. Followed core measures. Vertigo was improving. Also found to be flu positive and giving supportive care, no shortness of air noted. Blood pressures have been elevated 2/2 CVA, not on any medications at home, considering losartan at discharge. A1c elevated to 7.4, plan to increase metformin and begin jardiance at discharge. Otherwise patient is doing well.     [phone]  Patient doing well this morning. No acute events overnight. Has been ambulating and eating well. No worsening syncope or dizziness. No chest pain or shortness of air. No changes to bowel or bladder habits. No weakness or visual changes. Diarrhea has resolved since yesterday     12.17.2022 patient seen this a.m. no complaints states she does feel weak though and would like to go to rehab. Also adds that she is less dizzy today. Rehab consult placed yesterday. We will adjust medications to control blood pressure will DC IV fluids, start losartan for blood pressure control and renal protection secondary to diabetes. Humerus x-ray negative for fracture.       Medications:  Reviewed    Infusion Medications    dextrose       Scheduled Medications    losartan  100 mg Oral Daily    clopidogrel  75 mg Oral Daily    enoxaparin  40 mg SubCUTAneous Daily    aspirin  81 mg Oral Daily    Or    aspirin  300 mg Rectal Daily    atorvastatin  80 mg Oral Nightly    insulin lispro  0-4 Units SubCUTAneous TID     insulin lispro  0-4 Units SubCUTAneous Nightly     PRN Meds: HYDROcodone 5 mg - acetaminophen **OR** HYDROcodone 5 mg - acetaminophen, ondansetron **OR** ondansetron, polyethylene glycol, acetaminophen **OR** acetaminophen, glucose, dextrose bolus **OR** dextrose bolus, glucagon (rDNA), dextrose      Intake/Output Summary (Last 24 hours) at 12/17/2022 0857  Last data filed at 12/17/2022 0428  Gross per 24 hour   Intake 3924.29 ml   Output --   Net 3924.29 ml         Diet:  ADULT DIET; Easy to Chew; 4 carb choices (60 gm/meal); Low Fat/Low Chol/High Fiber/NASIR    Exam:  BP (!) 158/77   Pulse 65   Temp 98.6 °F (37 °C) (Oral)   Resp 18   Ht 5' 3.5\" (1.613 m)   Wt 165 lb 9.1 oz (75.1 kg)   SpO2 95%   BMI 28.87 kg/m²     Physical Exam  Vitals and nursing note reviewed. Constitutional:       General: She is not in acute distress. HENT:      Head: Normocephalic and atraumatic. Nose: Nose normal.      Mouth/Throat:      Mouth: Mucous membranes are moist.      Pharynx: Oropharynx is clear. Eyes:      Extraocular Movements: Extraocular movements intact. Pupils: Pupils are equal, round, and reactive to light. Cardiovascular:      Rate and Rhythm: Normal rate and regular rhythm. Pulses: Normal pulses. Heart sounds: Normal heart sounds. Pulmonary:      Effort: Pulmonary effort is normal.      Breath sounds: Normal breath sounds. Abdominal:      Palpations: Abdomen is soft. Tenderness: There is no abdominal tenderness. Musculoskeletal:         General: No signs of injury. Normal range of motion. Cervical back: Normal range of motion and neck supple. Skin:     General: Skin is warm and dry. Capillary Refill: Capillary refill takes less than 2 seconds. Neurological:      General: No focal deficit present. Mental Status: She is alert and oriented to person, place, and time. Mental status is at baseline.    Psychiatric:         Mood and Affect: Mood normal.         Behavior: Behavior normal.       Labs:   Recent Labs     12/14/22  2221 12/16/22  8131 12/17/22  0538   WBC 8.8 5.1 4.8   HGB 11.8* 10.6* 10.9*   HCT 35.3* 33.5* 33.6*    188 171       Recent Labs     12/14/22  2221 12/15/22  0756 12/17/22  0538    140 141   K 3.8 4.0 4.0    105 106   CO2 25 24 27   BUN 19* 20 18   CREATININE 1.3 1.2 1.2   CALCIUM  --  8.4* 8.3*       Recent Labs     12/14/22  2221   AST 26   ALT 32   BILITOT 0.5   ALKPHOS 140*       No results for input(s): INR in the last 72 hours. No results for input(s): Chris Gubler in the last 72 hours. Urinalysis:    No results found for: Jennifer Mocha, BACTERIA, RBCUA, BLOODU, SPECGRAV, Victor [address] [place] [address]    Radiology:  XR HUMERUS LEFT (MIN 2 VIEWS)   Final Result   No fracture or acute bony abnormality visualized. Chronic degenerative changes are noted. **This report has been created using voice recognition software. It may contain minor errors which are inherent in voice recognition technology. **      Final report electronically signed by Dr Chano Cornelius on 12/16/2022 2:30 PM      CTA NECK W WO CONTRAST   Final Result    Normal CTA of the head and neck. **This report has been created using voice recognition software. It may contain minor errors which are inherent in voice recognition technology. **      Final report electronically signed by Dr. Augie Goode on 12/16/2022 10:37 AM      CTA HEAD W WO CONTRAST   Final Result    Normal CTA of the head and neck. **This report has been created using voice recognition software. It may contain minor errors which are inherent in voice recognition technology. **      Final report electronically signed by Dr. Augie Goode on 12/16/2022 10:37 AM      MRI brain without contrast   Final Result   Impression:   1. Two very small acute infarcts; one is at the anterior left temporal    lobe and the other is in the left cerebellar peduncle. 2. No acute intracranial hemorrhage.    3. Volume loss with cerebral white matter disease, likely ischemic

## 2022-12-17 NOTE — PLAN OF CARE
Problem: Discharge Planning  Goal: Discharge to home or other facility with appropriate resources  Outcome: Progressing  Flowsheets (Taken 12/17/2022 0250)  Discharge to home or other facility with appropriate resources:   Identify barriers to discharge with patient and caregiver   Arrange for needed discharge resources and transportation as appropriate   Identify discharge learning needs (meds, wound care, etc)   Refer to discharge planning if patient needs post-hospital services based on physician order or complex needs related to functional status, cognitive ability or social support system  Note: Case management and social work to assist with discharge planning. Neuro recommending IP Rehab at discharge.       Problem: Pain  Goal: Verbalizes/displays adequate comfort level or baseline comfort level  Outcome: Progressing  Flowsheets (Taken 12/17/2022 0250)  Verbalizes/displays adequate comfort level or baseline comfort level:   Encourage patient to monitor pain and request assistance   Assess pain using appropriate pain scale   Administer analgesics based on type and severity of pain and evaluate response   Implement non-pharmacological measures as appropriate and evaluate response     Problem: Safety - Adult  Goal: Free from fall injury  Outcome: Progressing  Flowsheets  Taken 12/17/2022 0250  Free From Fall Injury: Instruct family/caregiver on patient safety  Taken 12/17/2022 0249  Free From Fall Injury: Instruct family/caregiver on patient safety     Problem: Neurosensory - Adult  Goal: Achieves stable or improved neurological status  Outcome: Progressing  Flowsheets (Taken 12/17/2022 0250)  Achieves stable or improved neurological status:   Assess for and report changes in neurological status   Initiate measures to prevent increased intracranial pressure     Problem: Musculoskeletal - Adult  Goal: Return mobility to safest level of function  Outcome: Progressing  Flowsheets (Taken 12/17/2022 0250)  Return Mobility to Safest Level of Function:   Assess patient stability and activity tolerance for standing, transferring and ambulating with or without assistive devices   Assist with transfers and ambulation using safe patient handling equipment as needed   Obtain physical therapy/occupational therapy consults as needed   Ensure adequate protection for wounds/incisions during mobilization   Instruct patient/family in ordered activity level     Problem: Musculoskeletal - Adult  Goal: Maintain proper alignment of affected body part  Outcome: Progressing  Flowsheets (Taken 12/17/2022 0250)  Maintain proper alignment of affected body part: Support and protect limb and body alignment per provider's orders     Problem: Gastrointestinal - Adult  Goal: Minimal or absence of nausea and vomiting  Outcome: Progressing  Flowsheets (Taken 12/17/2022 0250)  Minimal or absence of nausea and vomiting:   Administer ordered antiemetic medications as needed   Administer IV fluids as ordered to ensure adequate hydration  Note: No nausea and vomiting reported this shift.       Problem: Infection - Adult  Goal: Absence of infection at discharge  Outcome: Progressing  Flowsheets (Taken 12/17/2022 0250)  Absence of infection at discharge:   Assess and monitor for signs and symptoms of infection   Monitor lab/diagnostic results     Problem: Metabolic/Fluid and Electrolytes - Adult  Goal: Electrolytes maintained within normal limits  Outcome: Progressing  Flowsheets (Taken 12/17/2022 0250)  Electrolytes maintained within normal limits: Monitor labs and assess patient for signs and symptoms of electrolyte imbalances     Problem: Chronic Conditions and Co-morbidities  Goal: Patient's chronic conditions and co-morbidity symptoms are monitored and maintained or improved  Outcome: Progressing  Flowsheets (Taken 12/17/2022 0250)  Care Plan - Patient's Chronic Conditions and Co-Morbidity Symptoms are Monitored and Maintained or Improved:   Monitor and assess patient's chronic conditions and comorbid symptoms for stability, deterioration, or improvement   Collaborate with multidisciplinary team to address chronic and comorbid conditions and prevent exacerbation or deterioration   Update acute care plan with appropriate goals if chronic or comorbid symptoms are exacerbated and prevent overall improvement and discharge     Problem: Skin/Tissue Integrity  Goal: Absence of new skin breakdown  Description: 1. Monitor for areas of redness and/or skin breakdown  2. Assess vascular access sites hourly  3. Every 4-6 hours minimum:  Change oxygen saturation probe site  4. Every 4-6 hours:  If on nasal continuous positive airway pressure, respiratory therapy assess nares and determine need for appliance change or resting period. Outcome: Progressing  Note: See flowsheets for skin integrity documentation. Care plan reviewed with patient. Patient verbalizes understanding of the plan of care and contribute to goal setting.

## 2022-12-17 NOTE — PLAN OF CARE
Problem: Discharge Planning  Goal: Discharge to home or other facility with appropriate resources  Outcome: Progressing  Flowsheets (Taken 12/17/2022 0250 by Betty Dawson RN)  Discharge to home or other facility with appropriate resources:   Identify barriers to discharge with patient and caregiver   Arrange for needed discharge resources and transportation as appropriate   Identify discharge learning needs (meds, wound care, etc)   Refer to discharge planning if patient needs post-hospital services based on physician order or complex needs related to functional status, cognitive ability or social support system     Problem: Pain  Goal: Verbalizes/displays adequate comfort level or baseline comfort level  Outcome: Progressing  Flowsheets (Taken 12/17/2022 0250 by Betty Dawson RN)  Verbalizes/displays adequate comfort level or baseline comfort level:   Encourage patient to monitor pain and request assistance   Assess pain using appropriate pain scale   Administer analgesics based on type and severity of pain and evaluate response   Implement non-pharmacological measures as appropriate and evaluate response     Problem: Safety - Adult  Goal: Free from fall injury  Outcome: Progressing  Flowsheets (Taken 12/17/2022 0250 by Betty Dawson RN)  Free From Fall Injury: Instruct family/caregiver on patient safety     Problem: Neurosensory - Adult  Goal: Achieves stable or improved neurological status  Outcome: Progressing  Flowsheets (Taken 12/17/2022 0250 by Betty Dawson RN)  Achieves stable or improved neurological status:   Assess for and report changes in neurological status   Initiate measures to prevent increased intracranial pressure     Problem: Musculoskeletal - Adult  Goal: Return mobility to safest level of function  Outcome: Progressing  Flowsheets (Taken 12/17/2022 0250 by Betty Dawson RN)  Return Mobility to Safest Level of Function:   Assess patient stability and activity tolerance for standing, transferring and ambulating with or without assistive devices   Assist with transfers and ambulation using safe patient handling equipment as needed   Obtain physical therapy/occupational therapy consults as needed   Ensure adequate protection for wounds/incisions during mobilization   Instruct patient/family in ordered activity level     Problem: Musculoskeletal - Adult  Goal: Maintain proper alignment of affected body part  Outcome: Progressing  Flowsheets (Taken 12/17/2022 0250 by Samantha Bee RN)  Maintain proper alignment of affected body part: Support and protect limb and body alignment per provider's orders     Problem: Gastrointestinal - Adult  Goal: Minimal or absence of nausea and vomiting  Outcome: Progressing  Flowsheets (Taken 12/17/2022 0250 by Samantha Bee RN)  Minimal or absence of nausea and vomiting:   Administer ordered antiemetic medications as needed   Administer IV fluids as ordered to ensure adequate hydration     Problem: Infection - Adult  Goal: Absence of infection at discharge  Outcome: Progressing  Flowsheets (Taken 12/17/2022 0250 by Samantha Bee RN)  Absence of infection at discharge:   Assess and monitor for signs and symptoms of infection   Monitor lab/diagnostic results     Problem: Metabolic/Fluid and Electrolytes - Adult  Goal: Electrolytes maintained within normal limits  Outcome: Progressing  Flowsheets (Taken 12/17/2022 0250 by Samantha Bee RN)  Electrolytes maintained within normal limits: Monitor labs and assess patient for signs and symptoms of electrolyte imbalances     Problem: Chronic Conditions and Co-morbidities  Goal: Patient's chronic conditions and co-morbidity symptoms are monitored and maintained or improved  Outcome: Progressing  Flowsheets (Taken 12/17/2022 0250 by Samantha Bee RN)  Care Plan - Patient's Chronic Conditions and Co-Morbidity Symptoms are Monitored and Maintained or Improved:   Monitor and assess patient's chronic conditions and comorbid symptoms for stability, deterioration, or improvement   Collaborate with multidisciplinary team to address chronic and comorbid conditions and prevent exacerbation or deterioration   Update acute care plan with appropriate goals if chronic or comorbid symptoms are exacerbated and prevent overall improvement and discharge     Problem: Skin/Tissue Integrity  Goal: Absence of new skin breakdown  Description: 1. Monitor for areas of redness and/or skin breakdown  2. Assess vascular access sites hourly  3. Every 4-6 hours minimum:  Change oxygen saturation probe site  4. Every 4-6 hours:  If on nasal continuous positive airway pressure, respiratory therapy assess nares and determine need for appliance change or resting period. Outcome: Progressing  Note: No new skin breakdown noted. Patient turns self. Care plan reviewed with patient and family. Patient and family verbalize understanding of the plan of care and contribute to goal setting.

## 2022-12-18 LAB
GLUCOSE BLD-MCNC: 113 MG/DL (ref 70–108)
GLUCOSE BLD-MCNC: 122 MG/DL (ref 70–108)
GLUCOSE BLD-MCNC: 176 MG/DL (ref 70–108)
GLUCOSE BLD-MCNC: 200 MG/DL (ref 70–108)

## 2022-12-18 PROCEDURE — 6360000002 HC RX W HCPCS: Performed by: PHYSICIAN ASSISTANT

## 2022-12-18 PROCEDURE — 97116 GAIT TRAINING THERAPY: CPT

## 2022-12-18 PROCEDURE — 6370000000 HC RX 637 (ALT 250 FOR IP): Performed by: STUDENT IN AN ORGANIZED HEALTH CARE EDUCATION/TRAINING PROGRAM

## 2022-12-18 PROCEDURE — 6370000000 HC RX 637 (ALT 250 FOR IP): Performed by: PHYSICIAN ASSISTANT

## 2022-12-18 PROCEDURE — 99222 1ST HOSP IP/OBS MODERATE 55: CPT | Performed by: PHYSICAL MEDICINE & REHABILITATION

## 2022-12-18 PROCEDURE — 6370000000 HC RX 637 (ALT 250 FOR IP): Performed by: INTERNAL MEDICINE

## 2022-12-18 PROCEDURE — 82948 REAGENT STRIP/BLOOD GLUCOSE: CPT

## 2022-12-18 PROCEDURE — 99233 SBSQ HOSP IP/OBS HIGH 50: CPT | Performed by: INTERNAL MEDICINE

## 2022-12-18 PROCEDURE — 1200000003 HC TELEMETRY R&B

## 2022-12-18 PROCEDURE — 97110 THERAPEUTIC EXERCISES: CPT

## 2022-12-18 RX ADMIN — ENOXAPARIN SODIUM 40 MG: 100 INJECTION SUBCUTANEOUS at 07:58

## 2022-12-18 RX ADMIN — ATORVASTATIN CALCIUM 80 MG: 80 TABLET, FILM COATED ORAL at 21:46

## 2022-12-18 RX ADMIN — DICLOFENAC SODIUM 4 G: 10 GEL TOPICAL at 21:46

## 2022-12-18 RX ADMIN — HYDROCODONE BITARTRATE AND ACETAMINOPHEN 1 TABLET: 5; 325 TABLET ORAL at 11:41

## 2022-12-18 RX ADMIN — DICLOFENAC SODIUM 4 G: 10 GEL TOPICAL at 11:41

## 2022-12-18 RX ADMIN — HYDROCODONE BITARTRATE AND ACETAMINOPHEN 1 TABLET: 5; 325 TABLET ORAL at 00:59

## 2022-12-18 RX ADMIN — ASPIRIN 81 MG: 81 TABLET, COATED ORAL at 07:58

## 2022-12-18 RX ADMIN — LOSARTAN POTASSIUM 100 MG: 100 TABLET, FILM COATED ORAL at 07:58

## 2022-12-18 RX ADMIN — CLOPIDOGREL BISULFATE 75 MG: 75 TABLET ORAL at 07:58

## 2022-12-18 ASSESSMENT — ENCOUNTER SYMPTOMS
CONSTIPATION: 1
BACK PAIN: 0
WHEEZING: 0
EYE DISCHARGE: 0
DIARRHEA: 0
EYE PAIN: 0
SORE THROAT: 0
RHINORRHEA: 0
SHORTNESS OF BREATH: 0
ABDOMINAL PAIN: 0
TROUBLE SWALLOWING: 0
NAUSEA: 0
COUGH: 0
VOMITING: 0

## 2022-12-18 ASSESSMENT — PAIN DESCRIPTION - LOCATION
LOCATION: ARM
LOCATION: ARM

## 2022-12-18 ASSESSMENT — PAIN SCALES - GENERAL
PAINLEVEL_OUTOF10: 3
PAINLEVEL_OUTOF10: 4
PAINLEVEL_OUTOF10: 5

## 2022-12-18 ASSESSMENT — PAIN DESCRIPTION - ORIENTATION
ORIENTATION: LEFT
ORIENTATION: LEFT

## 2022-12-18 ASSESSMENT — PAIN - FUNCTIONAL ASSESSMENT
PAIN_FUNCTIONAL_ASSESSMENT: ACTIVITIES ARE NOT PREVENTED
PAIN_FUNCTIONAL_ASSESSMENT: ACTIVITIES ARE NOT PREVENTED

## 2022-12-18 ASSESSMENT — PAIN DESCRIPTION - DESCRIPTORS
DESCRIPTORS: ACHING
DESCRIPTORS: ACHING;DISCOMFORT

## 2022-12-18 NOTE — PLAN OF CARE
Problem: Discharge Planning  Goal: Discharge to home or other facility with appropriate resources  12/18/2022 0246 by Cheyenne Palma RN  Outcome: Progressing  Flowsheets (Taken 12/18/2022 0246)  Discharge to home or other facility with appropriate resources:   Identify barriers to discharge with patient and caregiver   Arrange for needed discharge resources and transportation as appropriate   Identify discharge learning needs (meds, wound care, etc)   Refer to discharge planning if patient needs post-hospital services based on physician order or complex needs related to functional status, cognitive ability or social support system     Problem: Pain  Goal: Verbalizes/displays adequate comfort level or baseline comfort level  12/18/2022 0246 by Cheyenne Palma RN  Outcome: Progressing  Flowsheets (Taken 12/18/2022 0246)  Verbalizes/displays adequate comfort level or baseline comfort level:   Encourage patient to monitor pain and request assistance   Assess pain using appropriate pain scale   Administer analgesics based on type and severity of pain and evaluate response   Implement non-pharmacological measures as appropriate and evaluate response     Problem: Safety - Adult  Goal: Free from fall injury  12/18/2022 0246 by Cheyenne Palma RN  Outcome: Progressing  Flowsheets  Taken 12/18/2022 0246  Free From Fall Injury: Instruct family/caregiver on patient safety  Taken 12/18/2022 0242  Free From Fall Injury: Instruct family/caregiver on patient safety     Problem: Neurosensory - Adult  Goal: Achieves stable or improved neurological status  12/18/2022 0246 by Cheyenne Palma RN  Outcome: Progressing  Flowsheets (Taken 12/18/2022 0246)  Achieves stable or improved neurological status: Assess for and report changes in neurological status     Problem: Musculoskeletal - Adult  Goal: Return mobility to safest level of function  12/18/2022 0246 by Cheyenne Palma RN  Outcome: Progressing  Flowsheets (Taken 12/18/2022 0516)  Return Mobility to Safest Level of Function:   Assess patient stability and activity tolerance for standing, transferring and ambulating with or without assistive devices   Assist with transfers and ambulation using safe patient handling equipment as needed     Problem: Musculoskeletal - Adult  Goal: Maintain proper alignment of affected body part  12/18/2022 0246 by Martin Farley RN  Outcome: Progressing  Flowsheets (Taken 12/18/2022 0246)  Maintain proper alignment of affected body part: Support and protect limb and body alignment per provider's orders     Problem: Gastrointestinal - Adult  Goal: Minimal or absence of nausea and vomiting  12/18/2022 0246 by Martin Farley RN  Outcome: Progressing  Flowsheets (Taken 12/18/2022 0246)  Minimal or absence of nausea and vomiting: Administer ordered antiemetic medications as needed  Note: No nausea noted this shift.       Problem: Infection - Adult  Goal: Absence of infection at discharge  12/18/2022 0246 by Martin Farley RN  Outcome: Progressing  Flowsheets (Taken 12/18/2022 0246)  Absence of infection at discharge:   Assess and monitor for signs and symptoms of infection   Monitor lab/diagnostic results     Problem: Metabolic/Fluid and Electrolytes - Adult  Goal: Electrolytes maintained within normal limits  12/18/2022 0246 by Martin Farley RN  Outcome: Progressing  Flowsheets (Taken 12/18/2022 0246)  Electrolytes maintained within normal limits:   Monitor labs and assess patient for signs and symptoms of electrolyte imbalances   Administer electrolyte replacement as ordered   Monitor response to electrolyte replacements, including repeat lab results as appropriate     Problem: Chronic Conditions and Co-morbidities  Goal: Patient's chronic conditions and co-morbidity symptoms are monitored and maintained or improved  12/18/2022 0246 by Martin Farley RN  Outcome: Progressing  Flowsheets (Taken 12/18/2022 0246)  Care Plan - Patient's Chronic Conditions and Co-Morbidity Symptoms are Monitored and Maintained or Improved:   Monitor and assess patient's chronic conditions and comorbid symptoms for stability, deterioration, or improvement   Collaborate with multidisciplinary team to address chronic and comorbid conditions and prevent exacerbation or deterioration   Update acute care plan with appropriate goals if chronic or comorbid symptoms are exacerbated and prevent overall improvement and discharge     Problem: Skin/Tissue Integrity  Goal: Absence of new skin breakdown  Description: 1. Monitor for areas of redness and/or skin breakdown  2. Assess vascular access sites hourly  3. Every 4-6 hours minimum:  Change oxygen saturation probe site  4. Every 4-6 hours:  If on nasal continuous positive airway pressure, respiratory therapy assess nares and determine need for appliance change or resting period. 12/18/2022 0246 by Beth Reeves RN  Outcome: Progressing  Note: See flowsheets for skin integrity documentation. Care plan reviewed with patient. Patient Verbalizes understanding of the plan of care and contribute to goal setting.

## 2022-12-18 NOTE — PLAN OF CARE
Problem: Discharge Planning  Goal: Discharge to home or other facility with appropriate resources  Outcome: Progressing  Flowsheets (Taken 12/17/2022 0250 by Arnold Albarran, RN)  Discharge to home or other facility with appropriate resources:   Identify barriers to discharge with patient and caregiver   Arrange for needed discharge resources and transportation as appropriate   Identify discharge learning needs (meds, wound care, etc)   Refer to discharge planning if patient needs post-hospital services based on physician order or complex needs related to functional status, cognitive ability or social support system     Problem: Pain  Goal: Verbalizes/displays adequate comfort level or baseline comfort level  Outcome: Progressing  Flowsheets (Taken 12/17/2022 0250 by Arnold Albarran, RN)  Verbalizes/displays adequate comfort level or baseline comfort level:   Encourage patient to monitor pain and request assistance   Assess pain using appropriate pain scale   Administer analgesics based on type and severity of pain and evaluate response   Implement non-pharmacological measures as appropriate and evaluate response     Problem: Safety - Adult  Goal: Free from fall injury  Outcome: Progressing  Flowsheets (Taken 12/17/2022 0250 by Arnold Albarran, RN)  Free From Fall Injury: Instruct family/caregiver on patient safety     Problem: Neurosensory - Adult  Goal: Achieves stable or improved neurological status  Outcome: Progressing  Flowsheets (Taken 12/17/2022 0250 by Arnold Albarran, RN)  Achieves stable or improved neurological status:   Assess for and report changes in neurological status   Initiate measures to prevent increased intracranial pressure     Problem: Musculoskeletal - Adult  Goal: Return mobility to safest level of function  Outcome: Progressing  Flowsheets (Taken 12/17/2022 0250 by Arnold Albarran, RN)  Return Mobility to Safest Level of Function:   Assess patient stability and activity tolerance for standing, transferring and ambulating with or without assistive devices   Assist with transfers and ambulation using safe patient handling equipment as needed   Obtain physical therapy/occupational therapy consults as needed   Ensure adequate protection for wounds/incisions during mobilization   Instruct patient/family in ordered activity level     Problem: Musculoskeletal - Adult  Goal: Maintain proper alignment of affected body part  Outcome: Progressing  Flowsheets (Taken 12/17/2022 0250 by Arnold Albarran, RN)  Maintain proper alignment of affected body part: Support and protect limb and body alignment per provider's orders     Problem: Gastrointestinal - Adult  Goal: Minimal or absence of nausea and vomiting  Outcome: Progressing  Flowsheets (Taken 12/17/2022 0250 by Arnold Albarran, RN)  Minimal or absence of nausea and vomiting:   Administer ordered antiemetic medications as needed   Administer IV fluids as ordered to ensure adequate hydration     Problem: Infection - Adult  Goal: Absence of infection at discharge  Outcome: Progressing  Flowsheets (Taken 12/17/2022 0250 by Arnold Albarran, RN)  Absence of infection at discharge:   Assess and monitor for signs and symptoms of infection   Monitor lab/diagnostic results     Problem: Metabolic/Fluid and Electrolytes - Adult  Goal: Electrolytes maintained within normal limits  Outcome: Progressing  Flowsheets (Taken 12/17/2022 0250 by Arnold Albarran, RN)  Electrolytes maintained within normal limits: Monitor labs and assess patient for signs and symptoms of electrolyte imbalances     Problem: Chronic Conditions and Co-morbidities  Goal: Patient's chronic conditions and co-morbidity symptoms are monitored and maintained or improved  Outcome: Progressing  Flowsheets (Taken 12/17/2022 0250 by Arnold Albarran, RN)  Care Plan - Patient's Chronic Conditions and Co-Morbidity Symptoms are Monitored and Maintained or Improved:   Monitor and assess patient's chronic conditions and comorbid

## 2022-12-18 NOTE — PROGRESS NOTES
PROGRESS NOTE      Patient:  Jazmin Weiss      Unit/Bed:7K-02/002-A    YOB: 1950    MRN: [id number]       Acct: [de-identified]     PCP: REBECA Nowak CNP    Date of Admission: 12/14/2022      Assessment/Plan:    Anticipated Discharge in : 2-3 days    Active Hospital Problems    Diagnosis Date Noted    Intractable nausea and vomiting [R11.2] 12/16/2022     Priority: Medium    Vertigo [R42] 12/15/2022     Priority: Medium     Acute CVA  Noted on recent MRI  Neurology has been consulted and has signed off  Continue ASA, plavix, lipitor  Normal CTA Head/Neck  Normal echo  A1C resulted as below  Lipid Panel as below  PT/OT has seen patient, to get home health vs. IPR  Has been eating well, cleared by speech consult       Central Vertigo:    Did have positive orthostatics   MRI brain showed infarcts as above  Labyrinthitis also possible contributing factor   Meclizine prn  Symptoms improving but continue, being evaluated for possible IPR stay for this on 12/19    Essential hypertension:   Permissive hypertension for now  Patient states her oral agents were all recently stopped because her blood pressures were normal   Continue losartan   Continue further control if not worsening. HLD   On lipitor 80mg currently, has not been on it   ASCVD of 18%   LDL at 133, not at goal    NIDDM II:   Low dose sliding scale  Hypoglycemic treatment orders  ADA diet  7.4 A1C  Should increase metformin on Discharge  Also to begin Jardiance on discharge     Influenza A  Supportive care  On room air  Some nausea from this  PRN zofran and gentle IVF    Left arm Pain   Did fall on arm before admission   PRN tylenol   Xray normal   Given heating pad and voltaren gel    Memory Difficulties   Of note, patient is a current participant in a clinical trial for Alzheimer disease.     Elevated Alk Phos   Noted, will have outpatient followup     Normocytic Anemia   Noted on admission, unclear cause   Iron studies WNL        Chief Complaint: Dizziness    Hospital Course:   Per HPI  \" Patient transferred from [facility] for further evaluation of vertigo. The patient reports that one day prior to admission she started having \"dizziness\" that she best describes as the room spinning. The patient denies any focal deficits. She reports some changes in her vision - difficulty focusing. She also complains of a headache. Patient is admitted for further evaluation and stroke rule out. \"    Since admitted, MRI did confirm CVA. Neurology consulted. Pending CTA and echo. Followed core measures. Vertigo was improving. Also found to be flu positive and giving supportive care, no shortness of air noted. Blood pressures have been elevated 2/2 CVA, not on any medications at home, considering losartan at discharge. A1c elevated to 7.4, plan to increase metformin and begin jardiance at discharge. Otherwise patient is doing well.     [phone]  Patient doing well this morning. No acute events overnight. Has been ambulating and eating well. No worsening syncope or dizziness. No chest pain or shortness of air. No changes to bowel or bladder habits. No weakness or visual changes. Diarrhea has resolved since yesterday     12.17.2022 patient seen this a.m. no complaints states she does feel weak though and would like to go to rehab. Also adds that she is less dizzy today. Rehab consult placed yesterday. We will adjust medications to control blood pressure will DC IV fluids, start losartan for blood pressure control and renal protection secondary to diabetes. Humerus x-ray negative for fracture.     12/18/2022 -No acute events, pending eval      Medications:  Reviewed    Infusion Medications    dextrose       Scheduled Medications    losartan  100 mg Oral Daily    clopidogrel  75 mg Oral Daily    enoxaparin  40 mg SubCUTAneous Daily    aspirin  81 mg Oral Daily    Or    aspirin  300 mg Rectal Daily atorvastatin  80 mg Oral Nightly    insulin lispro  0-4 Units SubCUTAneous TID WC    insulin lispro  0-4 Units SubCUTAneous Nightly     PRN Meds: diclofenac sodium, HYDROcodone 5 mg - acetaminophen **OR** HYDROcodone 5 mg - acetaminophen, ondansetron **OR** ondansetron, polyethylene glycol, acetaminophen **OR** acetaminophen, glucose, dextrose bolus **OR** dextrose bolus, glucagon (rDNA), dextrose      Intake/Output Summary (Last 24 hours) at 12/18/2022 0957  Last data filed at 12/18/2022 0848  Gross per 24 hour   Intake 600 ml   Output --   Net 600 ml         Diet:  ADULT DIET; Easy to Chew; 4 carb choices (60 gm/meal); Low Fat/Low Chol/High Fiber/NASIR    Exam:  /78   Pulse 65   Temp 97.5 °F (36.4 °C) (Oral)   Resp 16   Ht 5' 3.5\" (1.613 m)   Wt 165 lb 9.1 oz (75.1 kg)   SpO2 95%   BMI 28.87 kg/m²     Physical Exam  Vitals and nursing note reviewed. Constitutional:       General: She is not in acute distress. HENT:      Head: Normocephalic and atraumatic. Nose: Nose normal.      Mouth/Throat:      Mouth: Mucous membranes are moist.      Pharynx: Oropharynx is clear. Eyes:      Extraocular Movements: Extraocular movements intact. Pupils: Pupils are equal, round, and reactive to light. Cardiovascular:      Rate and Rhythm: Normal rate and regular rhythm. Pulses: Normal pulses. Heart sounds: Normal heart sounds. Pulmonary:      Effort: Pulmonary effort is normal.      Breath sounds: Normal breath sounds. Abdominal:      Palpations: Abdomen is soft. Tenderness: There is no abdominal tenderness. Musculoskeletal:         General: No signs of injury. Normal range of motion. Cervical back: Normal range of motion and neck supple. Skin:     General: Skin is warm and dry. Capillary Refill: Capillary refill takes less than 2 seconds. Neurological:      General: No focal deficit present. Mental Status: She is alert and oriented to person, place, and time. Mental status is at baseline. Psychiatric:         Mood and Affect: Mood normal.         Behavior: Behavior normal.       Labs:   Recent Labs     12/16/22  0642 12/17/22  0538   WBC 5.1 4.8   HGB 10.6* 10.9*   HCT 33.5* 33.6*    171       Recent Labs     12/17/22  0538      K 4.0      CO2 27   BUN 18   CREATININE 1.2   CALCIUM 8.3*       No results for input(s): AST, ALT, BILIDIR, BILITOT, ALKPHOS in the last 72 hours. No results for input(s): INR in the last 72 hours. No results for input(s): Diane Smack in the last 72 hours. Urinalysis:    No results found for: Glorietta March, BACTERIA, RBCUA, BLOODU, SPECGRAV, Victor [address] [place] [address]    Radiology:  XR HUMERUS LEFT (MIN 2 VIEWS)   Final Result   No fracture or acute bony abnormality visualized. Chronic degenerative changes are noted. **This report has been created using voice recognition software. It may contain minor errors which are inherent in voice recognition technology. **      Final report electronically signed by Dr Cordell Stanford on 12/16/2022 2:30 PM      CTA NECK W WO CONTRAST   Final Result    Normal CTA of the head and neck. **This report has been created using voice recognition software. It may contain minor errors which are inherent in voice recognition technology. **      Final report electronically signed by Dr. Skinny Ingram on 12/16/2022 10:37 AM      CTA HEAD W WO CONTRAST   Final Result    Normal CTA of the head and neck. **This report has been created using voice recognition software. It may contain minor errors which are inherent in voice recognition technology. **      Final report electronically signed by Dr. Skinny Ingram on 12/16/2022 10:37 AM      MRI brain without contrast   Final Result   Impression:   1. Two very small acute infarcts; one is at the anterior left temporal    lobe and the other is in the left cerebellar peduncle. 2. No acute intracranial hemorrhage.    3. Volume loss with cerebral white matter disease, likely ischemic    microvascular in nature. This document has been electronically signed by: Frank Guidry MD on    12/15/2022 08:37 PM         CT HEAD WO CONTRAST   Final Result   1. No acute intracranial process. 2. Minimal changes of chronic microvascular ischemic disease. This document has been electronically signed by: Brian Fletcher DO on    12/15/2022 03:26 AM      All CTs at this facility use dose modulation techniques and iterative    reconstructions, and/or weight-based dosing   when appropriate to reduce radiation to a low as reasonably achievable. Diet: ADULT DIET; Easy to Chew; 4 carb choices (60 gm/meal);  Low Fat/Low Chol/High Fiber/NASIR    DVT prophylaxis: [x] Lovenox                                 [] SCDs                                 [] SQ Heparin                                 [] Encourage ambulation           [] Already on Anticoagulation     Disposition:    [x] Home       [] TCU       [] Rehab       [] Psych       [] SNF       [] [place]       [] Other-    Code Status: Full Code    PT/OT Eval Status: Ordered       Electronically signed by aRfat Black MD on 12/18/2022 at 9:57 AM

## 2022-12-18 NOTE — CONSULTS
Physical Medicine & Rehabilitation Consultation Note      Admitting Physician: Amanda Carmichael DO    Primary Care Provider: REBECA Greco CNP     Reason for Consult: Central vertigo and the CVA    History of Present Illness:  Pollo Ulloa is a 71 y.o. right-handed  female with a history of diabetes mellitus, hypertension, status post tubal ligation, status post bilateral eyes cataract surgeries, was admitted to [address] [place] [address] on 12/14/2022 for suspected stroke. The patient says she first developed cough and headache started on 12/12/2022 followed by development of nausea, vomiting, watery diarrhea, dizziness with room spinning sensation on 12/13/2022. She had a fall accident hitting her left arm causing left arm pain on 12/14/2022. Therefore she went to [facility] on 12/14/2022 for evaluation. Because she was suspected to have stroke, she was transferred to [address] [place] [address] for further evaluation. Her influenza A antigen test was found to be positive. CT of head done on 12/15/2022 showed no acute intracranial process. Neurology saw the patient on 12/15/2022 and diagnosed patient of having labyrinthitis secondary to influenza infection. Meclizine as needed was prescribed. Right however MRI of brain later was done on 12/15/2022 and showing very small acute infarct at left anterior temporal lobe and left cerebellar peduncle. She was placed on aspirin and Plavix for 21 days followed by aspirin only afterward. At the present time the patient says her nausea, vomiting and diarrhea had completely stopped. She also denies having vertigo sensation now. She still has residual mildly productive cough but denies feeling shortness of breath. She says she still has mild dizziness but denies having headache or lightheadedness. She said last night she has an episode of heartburn.   Although diarrhea had stopped, she now feels slightly constipated. She denies having weakness, numbness or tingling sensation, bladder or bowel incontinence. She says she is tolerating the rehab therapy treatment provided well. Most Recent Rehabilitation Assessments:  PT:    (12/18/2022) : Timed Code Treatment Minutes: 23 Minutes  Activity Tolerance:  Patient tolerance of  treatment: good. Bed Mobility:  Supine to Sit: Stand By Assistance, with head of bed raised, with rail, with verbal cues      Transfers:  Sit to Stand: Contact Guard Assistance, X 1, cues for hand placement  Stand to Sit:Contact Guard Assistance, X 1, cues for hand placement     Ambulation:  Contact Guard Assistance  Distance: 10'x2   Surface: Level Tile  Device:Rolling Walker  Gait Deviations: Forward Flexed Posture, Slow Liane, Decreased Step Length Bilaterally, Decreased Gait Speed, Narrow Base of Support, and Mild Path Deviations     Balance:  Dynamic Standing Balance: Contact Guard Assistance, Patient donned/doffed briefs without assistance, Patient performed hand washing at bathroom sink. Good stability while completing to no LOB and minimal fatigue. OT:    (12/17/2022) : Timed Code Treatment Minutes: 15 Minutes  Activity Tolerance:  Patient tolerance of  treatment: fair. Pt had stood for 3 minutes while finishing her self care at the sink    ADL:   Grooming: Stand By Assistance. Washing her hands at the sink and rinsing her mouth with mouthwash  Toileting: Modified Independent. Pt had no difficulty with clothing management and with wiping herself after having voided. Toilet Transfer: Modified Independent. To/from the standard toilet seat using a grabbar . BALANCE:  Sitting Balance:  Supervision. Scooting at the edge of bed  Standing Balance: [address] When first standing up from the bed.      BED MOBILITY:  Rolling to Right: Stand By Assistance using the bedrail with head of bed elevated  Supine to Sit: Stand By Assistance using a bedrail with head of bed elevated  Sit to Supine: Stand By Assistance, with head of bed flat, with rail    Scooting: Stand By Assistance, with rail       TRANSFERS:  Sit to Stand:  Stand By Assistance. From the edge of bed  Stand to Sit: Stand By Assistance. To the edge of bed     FUNCTIONAL MOBILITY:  Assistive Device: Rolling Walker  Assist Level:  Stand By Assistance. Distance: To and from bathroom  Pt had some unsteadiness but no LOB. ADDITIONAL ACTIVITIES:  Reviewed with pt importance of staying hydrated and pausing after having sat up or after having stood to allow her blood pressure time to adjust to the change in position. Pt states that she had vomiting and diarrhea for at least a day prior to the admission. She agreed that she was told that she had been dehydrated when she was admitted. A bruise was noted over posterior L elbow. Pt completed LUE ROM exercises with prolonged stretch of her biceps muscle: elbow extended while shoulder in hyperextension- pt not able to tolerate full elbow extension; pt also had a long stretch in shoulder flexion and elbow flexion while resting her hand on her head. Tolerated fair. Her arm was resting on a pillow following the session. ST:    (12/16/2022) : [facility]) version 7.1 completed. Patient scored 26/30. Normal is greater than or equal to 26/30. DIAGNOSTIC IMPRESSIONS:    Kzlpor-Fswpepty-Kqlaxatlk Evaluation: Patient presents with cognitive functioning grossly intact evidenced by evaluation results outlined above. Speech and voice appear to be [facility] with no presence of dysarthria, dysphonia, or aphonia; patient intelligible at the conversation level with approximately 100% accuracy. Expressive and receptive language skills grossly intact with no apparent communicative breakdowns.  killed ST services recommended at this time to address HIGH level cognitive skilled (memory, executive functioning, attention) d/t high level of independence and plan to return to [facility]. Clinical Swallow Evaluation: Patient presents with oral phase of swallow function that is essentially [facility] with inability to fully discern potential presence of pharyngeal phase deficits without formal instrumentation. All labial/lingual structures intact and appear to be functioning appropriately at bedside. Oral phase highly unremarkable during consumption of hard/textured solids despite patient withOUT placement of dentures with patient demonstrating adequate mastication pattern for textural breakdown, cohesive bolus formation, and manipulation. Thin liquids consumed without overt difficulty and with suspected control/containment of fluid bolus. Patient  with x1 immediate,  dry cough to follow PO  trials of thin  via straw, though suspect unrelated  to swallow function. NO additional overt s/s aspiration exhibited across all consistencies/trials consumed, certainly not able to exclude pharyngeal phase dysfunction and/or airway invasion events in its entirety at bedside alone. Patient's swallow physiology does appear appropriate to support PO intake without distress with instrumental evaluation not warranted. Recommend dental soft/easy to chew diet with thin liquids d/t lack of denture placement. Post evaluation, patient withOUT respiratory distress upon leaving room; KARLIE Gómez notified re: clinical findings and recommendations from the assessment; verbal receptiveness noted. Past Medical History:        Diagnosis Date    Diabetes mellitus ([address] 01/2022    Hypertension 01/2022       Past Surgical History:        Procedure Laterality Date    CATARACT REMOVAL WITH IMPLANT Bilateral 12/15/2022    right eye on february 2022    TUBAL LIGATION  08/24/1975       Allergies: Allergies   Allergen Reactions    Other      Medication given for a urinary infection, allergic to it, passed out.         Current Medications:   Current Facility-Administered Medications   Medication Dose Route Frequency Provider Last Rate Last Admin    diclofenac sodium (VOLTAREN) 1 % gel 4 g  4 g Topical 4x Daily PRN Amalia Montgomery MD   4 g at 12/18/22 1141    losartan (COZAAR) tablet 100 mg  100 mg Oral Daily Josey Tran DO   100 mg at 12/18/22 0758    clopidogrel (PLAVIX) tablet 75 mg  75 mg Oral Daily PARDEEP Oden-C   75 mg at 12/18/22 0758    HYDROcodone-acetaminophen (NORCO) 5-325 MG per tablet 1 tablet  1 tablet Oral Q4H PRN Amalia Montgomery MD   1 tablet at 12/18/22 1141    Or    HYDROcodone-acetaminophen (NORCO) 5-325 MG per tablet 2 tablet  2 tablet Oral Q4H PRN Amalia Montgomery MD        ondansetron (ZOFRAN-ODT) disintegrating tablet 4 mg  4 mg Oral Q8H PRN Briana R [address], PA-C        Or    ondansetron (ZOFRAN) injection 4 mg  4 mg IntraVENous Q6H PRN [employer], PA-C   4 mg at 12/15/22 0758    polyethylene glycol (GLYCOLAX) packet 17 g  17 g Oral Daily PRN Briana Haney, PA-C        enoxaparin (LOVENOX) injection 40 mg  40 mg SubCUTAneous Daily [employer], PA-C   40 mg at 12/18/22 0758    aspirin EC tablet 81 mg  81 mg Oral Daily [employer], PA-C   81 mg at 12/18/22 8517    Or    aspirin suppository 300 mg  300 mg Rectal Daily Briana SHRUTHI Haney, PA-C        acetaminophen (TYLENOL) tablet 650 mg  650 mg Oral Q4H PRN [employer], PA-C   650 mg at 12/16/22 1793    Or    acetaminophen (TYLENOL) suppository 650 mg  650 mg Rectal Q4H PRN Briana R [address], PA-C        atorvastatin (LIPITOR) tablet 80 mg  80 mg Oral Nightly [employer], PA-C   80 mg at 12/17/22 2230    insulin lispro (HUMALOG) injection vial 0-4 Units  0-4 Units SubCUTAneous TID WC Briana Haney PA-C        insulin lispro (HUMALOG) injection vial 0-4 Units  0-4 Units SubCUTAneous Nightly Briana R Brown, PA-C        glucose chewable tablet 16 g  4 tablet Oral PRN Briana R PARDEEP Haney-C        dextrose bolus 10% 125 mL  125 mL IntraVENous PRN Briana R MARIA ISABEL Haney        Or    dextrose bolus 10% 250 mL  250 mL IntraVENous PRN Briana Haney PA-C        glucagon (rDNA) injection 1 mg  1 mg SubCUTAneous PRN Briana Haney PA-C        dextrose 10 % infusion   IntraVENous Continuous PRN Alexx Hutchins PA-C           Social History:  Social History     Socioeconomic History    Marital status: Single     Spouse name: Not on file    Number of children: Not on file    Years of education: Not on file    Highest education level: Not on file   Occupational History    Not on file   Tobacco Use    Smoking status: Never     Passive exposure: Never    Smokeless tobacco: Never   Vaping Use    Vaping Use: Never used   Substance and Sexual Activity    Alcohol use: Never    Drug use: Never    Sexual activity: Never   Other Topics Concern    Not on file   Social History Narrative    Not on file     Social Determinants of Health     Financial Resource Strain: Not on file   Food Insecurity: Not on file   Transportation Needs: Not on file   Physical Activity: Not on file   Stress: Not on file   Social Connections: Not on file   Intimate Partner Violence: Not on file   Housing Stability: Not on file     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   Lives with: Her 29years old grandson who works second shift (2 PM to 10 PM)  Home setup: 1 level house with 3 steps outside front door with 1 side handrail, and 2 step outside garage to with 1 side handrail  Previous level of independence: Independent in all ADLs, community ambulation without using any assistive walking device, and driving      Family History:       Problem Relation Age of Onset    Uterine Cancer Mother     Alzheimer's Disease Father     Cancer Brother         Cancer in the mouth    Cancer Brother         Cancer in the mouth    Prostate Cancer Brother     Breast Cancer Maternal Aunt     Breast Cancer Maternal Aunt     Alzheimer's Disease Paternal Aunt     Alzheimer's Disease Paternal Aunt          Review of Systems:  Review of Systems   Constitutional: Negative for chills, diaphoresis, fatigue and fever. HENT:  Negative for ear pain, hearing loss, rhinorrhea, sneezing, sore throat, tinnitus and trouble swallowing. Eyes:  Negative for pain, discharge and visual disturbance. Respiratory:  Negative for cough, shortness of breath and wheezing. Cardiovascular:  Negative for chest pain, palpitations and leg swelling. Gastrointestinal:  Positive for constipation. Negative for abdominal pain, diarrhea, nausea and vomiting. Endocrine: Negative for cold intolerance and heat intolerance. Genitourinary:  Negative for difficulty urinating and dysuria. Musculoskeletal:  Positive for gait problem. Negative for arthralgias, back pain, myalgias and neck pain. Skin:  Negative for rash. Allergic/Immunologic: Negative for food allergies. Neurological:  Positive for dizziness. Negative for facial asymmetry, speech difficulty, weakness, light-headedness, numbness and headaches. Hematological:  Does not bruise/bleed easily. Psychiatric/Behavioral:  Negative for dysphoric mood, hallucinations and sleep disturbance. The patient is not nervous/anxious.         Physical Exam:  /70   Pulse 72   Temp 97.5 °F (36.4 °C) (Oral)   Resp 16   Ht 5' 3.5\" (1.613 m)   Wt 165 lb 9.1 oz (75.1 kg)   SpO2 98%   BMI 28.87 kg/m²   Physical Exam  General:  well-developed, well nourished  female; in no acute distress ; appropriate affect & mood; sitting on reclining chair comfortably  Eyes: pupil equally round ; extra-ocular motion intact bilaterally  Head, Ear, Nose, Mouth & Throat : normocephalic ; no tenderness at the face or head scalp; no discharge from ears or nose ; no deformity ; no facial swelling ; oral mucosa pink   Neck :  supple ; no tenderness ; no muscle spasm  Cardiovascular : regular rate & rhythm ; normal S1 & S2 heart sound ; no murmur ; normal peripheral pulse at the bilateral upper extremities; slightly reduced pulse strength at the bilateral lower extremities  Pulmonary : Breath sounds present at bilateral lung field; no wheezing ; no rale; no crackle  Gastrointestinal : soft, flat abdomen without tenderness ; normal bowel sound present   Back : no tenderness; no muscle spasm  Skin: no skin lesion or rash ; no pitting edema at all 4 extremities  Musculoskeletal : no limb asymmetry; no limb deformity; tenderness at the left biceps muscle; no tenderness at the rest of bilateral upper & lower extremities; no palpable mass at limbs ; no joints laxity or crepitation ; right shoulder flexion and abduction passive ROM reaching 160 degree; left shoulder flexion and abduction passive ROM reaching 140 degrees limited by pain; hip flexion passive ROM reaching 120 degrees bilaterally; ankle dorsiflexion passive ROM reaching 15 degrees bilaterally; normal functional joints ROM at the rest of bilateral upper & lower extremities  Cerebral :  alert ; awake ; oriented to place, person and time; follow two-step verbal command; able to recall 3/3 items given immediately and 2/3 items about 3 minutes later; able to repeat series of 5 single digit numbers in right order forward and backward; impaired abstract thinking; perform serial 7 subtraction test for 6 steps without mistake ([id number]  Cerebellum : no dysmetria with bilateral finger-to-nose test ; no dysmetria with bilateral heel-to-shin test  Cranial Nerves :  grossly intact CN II to XII function  Sensory : intact light touch and pin prick sensation at bilateral upper & lower extremities  Motor : normal tone at bilateral upper & lower extremities ; 4+5 to 5/5 muscle strength at left shoulder flexion and left elbow flexion; normal 5/5 muscle strength at the rest of bilateral upper & lower extremities  Reflex : 0 bilateral biceps, bilateral triceps, bilateral brachioradialis, bilateral knees and bilateral ankle reflexes   Pathological Reflex :  No Reina's sign ; no Babinski sign ; no ankle clonus  Gait : Not assessed      Diagnostics:  Recent Results (from the past 24 hour(s))   POCT Glucose    Collection Time: 12/17/22  8:54 PM   Result Value Ref Range    POC Glucose 149 (H) 70 - 108 mg/dl   POCT Glucose    Collection Time: 12/18/22  6:46 AM   Result Value Ref Range    POC Glucose 122 (H) 70 - 108 mg/dl   POCT Glucose    Collection Time: 12/18/22 10:36 AM   Result Value Ref Range    POC Glucose 176 (H) 70 - 108 mg/dl   POCT Glucose    Collection Time: 12/18/22  3:38 PM   Result Value Ref Range    POC Glucose 113 (H) 70 - 108 mg/dl        Latest Reference Range & Units 12/14/22 22:21 12/15/22 07:56 12/17/22 05:38   Sodium 135 - 145 meq/L 140 140 141   Potassium 3.5 - 5.2 meq/L 3.8 4.0 4.0   Chloride 98 - 111 meq/L 103 105 106   CO2 23 - 33 meq/L 25 24 27   BUN,BUNPL 7 - 22 mg/dL 19 (H) 20 18   Creatinine 0.4 - 1.2 mg/dL 1.3 1.2 1.2   Anion Gap 8.0 - 16.0 meq/L 12.0 11.0 8.0   Est, Glom Filt Rate >60 ml/min/1.73m2  48 ! 48 ! FOLATE, FOLAT 4.8 - 24.2 ng/mL   14.6   GFR Est >60 ml/min/1.73m2 44 !      Glucose, Random 70 - 108 mg/dL 204 (H) 153 (H) 106   CALCIUM, SERUM, [id number] 8.5 - 10.5 mg/dL  8.4 (L) 8.3 (L)   Total Protein 6.4 - 8.2 gm/dl 7.2     POC CALCIUM 8.5 - 10.1 mg/dl 8.3 (L)     Vitamin B-12 211 - 911 pg/mL   557   (H): Data is abnormally high  !: Data is abnormal  (L): Data is abnormally low       Latest Reference Range & Units 12/17/22 15:53 12/17/22 20:54 12/18/22 06:46 12/18/22 10:36   POC Glucose 70 - 108 mg/dl 120 (H) 149 (H) 122 (H) 176 (H)   (H): Data is abnormally high       Latest Reference Range & Units 12/16/22 06:42   CHOLESTEROL, TOTAL, [id number] 100 - 199 mg/dL 196   HDL Cholesterol mg/dL 36   LDL Calculated mg/dL 133   Triglycerides 0 - 199 mg/dL 133        Latest Reference Range & Units 12/14/22 22:21 12/16/22 06:42 12/17/22 05:38   WBC 4.8 - 10.8 thou/mm3 8.8 5.1 4.8   RBC 4.20 - 5.40 mill/mm3 4.03 (L) 3.59 (L) 3.73 (L)   Hemoglobin Quant 12.0 - 16.0 gm/dl 11.8 (L) 10.6 (L) 10.9 (L)   Hematocrit 37.0 - 47.0 % 35.3 (L) 33.5 (L) 33.6 (L)   MCV 81.0 - 99.0 fL 87.6 93.3 90.1   MCH 26.0 - 33.0 pg 29.3 29.5 29.2   MCHC 32.2 - 35.5 gm/dl 33.4 31.6 (L) 32.4   MPV 9.4 - 12.4 fL 9.3 (L) 9.8 9.6   RDW 11.5 - 14.9 % 13.0     RDW-CV 11.5 - 14.5 %  13.3 13.3   RDW-SD 35.0 - 45.0 fL  45.2 (H) 44.2   Platelet Count 631 - 400 thou/mm3 200 188 171   Eosinophils % 0.0 - 4.0 % 0.3     Lymphocytes Absolute 1.0 - 4.8 thou/mm3 0.9 (L)  2.1   Monocytes Absolute 0.4 - 1.3 thou/mm3   0.4   Eosinophils Absolute 0.0 - 0.4 thou/mm3 0.0  0.2   Basophils Absolute 0.0 - 0.1 thou/mm3 0.0  0.0   Seg Neutrophils % 82.2 (H)  43.4   Segs Absolute 1.8 - 7.7 thou/mm3 7.2  2.1   Lymphocytes % 10.6 (L)  44.0   Monocytes % 6.5  0.6  8.5   Eosinophils %   3.3   Basophils % 0.2  0.4   Immature Grans (Abs) 0.00 - 0.07 thou/mm3 0.02  0.02   Immature Granulocytes % 0  0.4   (L): Data is abnormally low  (H): Data is abnormally high       Latest Reference Range & Units 12/14/22 22:23   Flu A Antigen NEGATIVE  Positive ! Flu B Antigen NEGATIVE  Negative   !: Data is abnormal      MRI of brain without contrast (11/22/2022) : Impression   1. The brain volume is at the lower limits of normal.   2. Minimal severity chronic small vessel ischemic changes. CT of the head without contrast (12/15/2022) : Impression   1. No acute intracranial process. 2. Minimal changes of chronic microvascular ischemic disease. MRI of brain without contrast (12/15/2022) : Impression   1. Two very small acute infarcts; one is at the anterior left temporal lobe and the other is in the left cerebellar peduncle. 2. No acute intracranial hemorrhage. 3. Volume loss with cerebral white matter disease, likely ischemic microvascular in nature. CTA of neck and head with and without contrast (12/16/2022) : Impression    Normal CTA of the head and neck       X-ray of left humerus (12/16/2022) :   Impression   No fracture or acute bony abnormality visualized. Chronic degenerative changes are noted. Echocardiogram (12/16/2022) :  Conclusions Summary  Ejection fraction is visually estimated at 60%. Overall left ventricular function is normal.      Impression:  Small acute anterior left temporal lobe and the left cerebellar peduncle stroke  Symptomatic improving Influenza A infection resulting labyrinthitis causing vertigo, dizziness, nausea and vomiting  Status post fall resulting left arm contusion with left biceps muscle strain  Radiographic evidence of left glenohumeral and acromioclavicular joint osteoarthritis  Hypertension  Diabetes mellitus    Continuing ongoing PT and OT treatment is still medically indicated. Today the patient is able to perform bed mobility with standby assist, and sit to stand and stand to sit with only contact-guard. She ambulated 10 feet twice using rolling walker with only contact guarding today. She is no longer experiencing vertigo symptom. She says her nausea, vomiting and diarrhea had stopped. I think Intensive inpatient rehabilitation intervention is not indicated. Further rehab intervention with home-based PT and OT treatment provided by home health care service, or outpatient PT and OT referral is recommended as discharge rehabilitation plan. Recommendations:  Continuing ongoing PT and OT treatment while the patient remained in acute hospital  Intensive inpatient rehabilitation treatment is not medically indicated  Recommend referral for home-based PT and OT treatment provided by home health care service, or referral for outpatient PT and OT as discharge rehabilitation plan  Continue Plavix and aspirin usage for 21 days followed by aspirin 81 mg daily as per neurology service recommendation  Continue Lipitor usage  Recommend referring patient for outpatient OT  evaluation prior to resuming driving      It was my pleasure to evaluate Rebecca Mcguire today. Please call with questions.       Holger Hennessy Toro Ornelas MD

## 2022-12-18 NOTE — PROGRESS NOTES
[address] [address]  INPATIENT PHYSICAL THERAPY  DAILY NOTE  [facility] 7K - 7K-02/002-A    Time In: 1685  Time Out: 1035  Timed Code Treatment Minutes: 23 Minutes  Minutes: 23          Date: 2022  Patient Name: Candie Gasca,  Gender:  female        MRN: [id number]  : 1950  (71 y.o.)     Referring Practitioner: Jackie Ramos PA-C  Diagnosis: dizziness  Additional Pertinent Hx: Per H&P 12/15: Patient transferred from [facility] for further evaluation of vertigo. The patient reports that one day prior to admission she started having \"dizziness\" that she best describes as the room spinning. The patient denies any focal deficits. She reports some changes in her vision - difficulty focusing. She did have a fall at home and struck her left side. She also complains of a headache. Patient is admitted for further evaluation and stroke rule out. MRI brain, and CTA of head and neck ordered. CT of head negative for acute abnormality. She presents with influenza A.. 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 X ray L humerus negative for fracture. Prior Level of Function:  Lives With: Family (grandson)  Type of Home: House  Home Layout: One level  Home Access: Stairs to enter with rails  Entrance Stairs - Number of Steps: 3  Entrance Stairs - Rails: Left  Home Equipment: Walker, rolling   Bathroom Shower/Tub: Tub/Shower unit    ADL Assistance: [address] [place] [address] Avenue: Independent  Homemaking Responsibilities: Yes  Ambulation Assistance: Independent  Transfer Assistance: Independent  Active : Yes  Additional Comments: she is normally indep with mobility. 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 She has assit with cleaning at home. Her grandson works outside of the home second shift.     Restrictions/Precautions:  Restrictions/Precautions: Isolation, Fall Risk, General Precautions  Position Activity Restriction  Other position/activity restrictions: droplet precautions- flu, monitor orthos     SUBJECTIVE: RN approved session. Patient in bed at arrival and pleasantly agrees to therapy. Patient requested to use restroom during session requiring increased time for completion. PAIN: 0/10: Denied     Vitals: Vitals not assessed per clinical judgement, see nursing flowsheet    OBJECTIVE:  Bed Mobility:  Supine to Sit: Stand By Assistance, with head of bed raised, with rail, with verbal cues     Transfers:  Sit to Stand: Contact Guard Assistance, X 1, cues for hand placement  Stand to Sit:Contact Guard Assistance, X 1, cues for hand placement    Ambulation:  Contact Guard Assistance  Distance: 10'x2   Surface: Level Tile  Device:Rolling Walker  Gait Deviations: Forward Flexed Posture, Slow Liane, Decreased Step Length Bilaterally, Decreased Gait Speed, Narrow Base of Support, and Mild Path Deviations    Balance:  Dynamic Standing Balance: Contact Guard Assistance, Patient donned/doffed briefs without assistance, Patient performed hand washing at bathroom sink. Good stability while completing to no LOB and minimal fatigue. Exercise:  Patient was guided in 1 set(s) 10 reps of exercise to both lower extremities. Ankle pumps, Glut sets, Quad sets, Hip abduction/adduction, Straight leg raises, Seated marches, Seated heel/toe raises, and Long arc quads. Exercises were completed for increased independence with functional mobility. Functional Outcome Measures: Completed  AM-PAC Inpatient Mobility without Stair Climbing Raw Score : 15  AM-PAC Inpatient without Stair Climbing T-Scale Score : 43.03    ASSESSMENT:  Assessment: Patient progressing toward established goals. Activity Tolerance:  Patient tolerance of  treatment: good.       Equipment Recommendations:Equipment Needed: No  Discharge Recommendations: Continue to assess pending progress, 24 hour assistance or supervision, Home with Home Health PT, and Patient would benefit from continued PT at discharge  Plan: Current Treatment Recommendations: Strengthening, Balance training, Functional mobility training, Transfer training, Gait training, Endurance training, Neuromuscular re-education, Patient/Caregiver education & training, Safety education & training, Home exercise program, Equipment evaluation, education, & procurement, Therapeutic activities  General Plan:  (5-6x GM/N)    Patient Education  Patient Education: Plan of Care    Goals:  Patient Goals : to walk straight  Short Term Goals  Time Frame for Short Term Goals: by hospital d/c  Short Term Goal 1: Pt to complete supine <->sit indep for ease with getting in and out of bed  Short Term Goal 2: Pt to complete sit <->stand with RW and S for safe transfers  Short Term Goal 3: Pt to ambulate >=50' with RW and S to progress towards her PLOF  Short Term Goal 4: Pt to ascend/descend 3 steps with uni HR for safe home entry with S  Long Term Goals  Time Frame for Long Term Goals : NA due to short ELOS    Following session, patient left in safe position with all fall risk precautions in place.

## 2022-12-19 ENCOUNTER — TELEPHONE (OUTPATIENT)
Dept: NEUROLOGY | Age: 72
End: 2022-12-19

## 2022-12-19 VITALS
DIASTOLIC BLOOD PRESSURE: 74 MMHG | HEIGHT: 64 IN | SYSTOLIC BLOOD PRESSURE: 156 MMHG | WEIGHT: 165.57 LBS | TEMPERATURE: 98.8 F | RESPIRATION RATE: 16 BRPM | HEART RATE: 72 BPM | OXYGEN SATURATION: 96 % | BODY MASS INDEX: 28.27 KG/M2

## 2022-12-19 LAB
ANION GAP SERPL CALCULATED.3IONS-SCNC: 11 MEQ/L (ref 8–16)
BASOPHILS # BLD: 0.3 %
BASOPHILS ABSOLUTE: 0 THOU/MM3 (ref 0–0.1)
BUN BLDV-MCNC: 17 MG/DL (ref 7–22)
CALCIUM SERPL-MCNC: 9.1 MG/DL (ref 8.5–10.5)
CHLORIDE BLD-SCNC: 103 MEQ/L (ref 98–111)
CO2: 26 MEQ/L (ref 23–33)
CREAT SERPL-MCNC: 1.2 MG/DL (ref 0.4–1.2)
EOSINOPHIL # BLD: 2 %
EOSINOPHILS ABSOLUTE: 0.1 THOU/MM3 (ref 0–0.4)
ERYTHROCYTE [DISTWIDTH] IN BLOOD BY AUTOMATED COUNT: 13 % (ref 11.5–14.5)
ERYTHROCYTE [DISTWIDTH] IN BLOOD BY AUTOMATED COUNT: 42.7 FL (ref 35–45)
GFR SERPL CREATININE-BSD FRML MDRD: 48 ML/MIN/1.73M2
GLUCOSE BLD-MCNC: 113 MG/DL (ref 70–108)
GLUCOSE BLD-MCNC: 116 MG/DL (ref 70–108)
GLUCOSE BLD-MCNC: 122 MG/DL (ref 70–108)
GLUCOSE BLD-MCNC: 179 MG/DL (ref 70–108)
HCT VFR BLD CALC: 37 % (ref 37–47)
HEMOGLOBIN: 12.1 GM/DL (ref 12–16)
IMMATURE GRANS (ABS): 0.02 THOU/MM3 (ref 0–0.07)
IMMATURE GRANULOCYTES: 0.3 %
LYMPHOCYTES # BLD: 31.3 %
LYMPHOCYTES ABSOLUTE: 2.2 THOU/MM3 (ref 1–4.8)
MCH RBC QN AUTO: 29.4 PG (ref 26–33)
MCHC RBC AUTO-ENTMCNC: 32.7 GM/DL (ref 32.2–35.5)
MCV RBC AUTO: 89.8 FL (ref 81–99)
MONOCYTES # BLD: 7.6 %
MONOCYTES ABSOLUTE: 0.5 THOU/MM3 (ref 0.4–1.3)
NUCLEATED RED BLOOD CELLS: 0 /100 WBC
PLATELET # BLD: 225 THOU/MM3 (ref 130–400)
PMV BLD AUTO: 9.9 FL (ref 9.4–12.4)
POTASSIUM REFLEX MAGNESIUM: 4 MEQ/L (ref 3.5–5.2)
RBC # BLD: 4.12 MILL/MM3 (ref 4.2–5.4)
SEG NEUTROPHILS: 58.5 %
SEGMENTED NEUTROPHILS ABSOLUTE COUNT: 4.2 THOU/MM3 (ref 1.8–7.7)
SODIUM BLD-SCNC: 140 MEQ/L (ref 135–145)
WBC # BLD: 7.1 THOU/MM3 (ref 4.8–10.8)

## 2022-12-19 PROCEDURE — 82948 REAGENT STRIP/BLOOD GLUCOSE: CPT

## 2022-12-19 PROCEDURE — 6370000000 HC RX 637 (ALT 250 FOR IP): Performed by: PHYSICIAN ASSISTANT

## 2022-12-19 PROCEDURE — 97530 THERAPEUTIC ACTIVITIES: CPT

## 2022-12-19 PROCEDURE — 97116 GAIT TRAINING THERAPY: CPT

## 2022-12-19 PROCEDURE — 97110 THERAPEUTIC EXERCISES: CPT

## 2022-12-19 PROCEDURE — 6370000000 HC RX 637 (ALT 250 FOR IP): Performed by: INTERNAL MEDICINE

## 2022-12-19 PROCEDURE — 85025 COMPLETE CBC W/AUTO DIFF WBC: CPT

## 2022-12-19 PROCEDURE — 97535 SELF CARE MNGMENT TRAINING: CPT

## 2022-12-19 PROCEDURE — 36415 COLL VENOUS BLD VENIPUNCTURE: CPT

## 2022-12-19 PROCEDURE — 99239 HOSP IP/OBS DSCHRG MGMT >30: CPT | Performed by: INTERNAL MEDICINE

## 2022-12-19 PROCEDURE — 80048 BASIC METABOLIC PNL TOTAL CA: CPT

## 2022-12-19 PROCEDURE — 6360000002 HC RX W HCPCS: Performed by: PHYSICIAN ASSISTANT

## 2022-12-19 RX ORDER — EZETIMIBE 10 MG/1
10 TABLET ORAL NIGHTLY
Status: DISCONTINUED | OUTPATIENT
Start: 2022-12-19 | End: 2022-12-19 | Stop reason: HOSPADM

## 2022-12-19 RX ORDER — CLOPIDOGREL BISULFATE 75 MG/1
75 TABLET ORAL DAILY
Qty: 30 TABLET | Refills: 3 | Status: SHIPPED | OUTPATIENT
Start: 2022-12-20

## 2022-12-19 RX ORDER — ASPIRIN 81 MG/1
81 TABLET ORAL DAILY
Qty: 30 TABLET | Refills: 3 | Status: SHIPPED | OUTPATIENT
Start: 2022-12-20

## 2022-12-19 RX ORDER — EZETIMIBE 10 MG/1
10 TABLET ORAL NIGHTLY
Qty: 30 TABLET | Refills: 3 | Status: SHIPPED | OUTPATIENT
Start: 2022-12-19

## 2022-12-19 RX ORDER — HYDROCHLOROTHIAZIDE 25 MG/1
25 TABLET ORAL DAILY
Status: DISCONTINUED | OUTPATIENT
Start: 2022-12-19 | End: 2022-12-19 | Stop reason: HOSPADM

## 2022-12-19 RX ORDER — LOSARTAN POTASSIUM 100 MG/1
100 TABLET ORAL DAILY
Qty: 30 TABLET | Refills: 3 | Status: SHIPPED | OUTPATIENT
Start: 2022-12-20

## 2022-12-19 RX ORDER — ATORVASTATIN CALCIUM 80 MG/1
80 TABLET, FILM COATED ORAL NIGHTLY
Qty: 30 TABLET | Refills: 3 | Status: SHIPPED | OUTPATIENT
Start: 2022-12-19

## 2022-12-19 RX ORDER — HYDROCHLOROTHIAZIDE 25 MG/1
25 TABLET ORAL DAILY
Qty: 30 TABLET | Refills: 3 | Status: SHIPPED | OUTPATIENT
Start: 2022-12-19

## 2022-12-19 RX ADMIN — HYDROCHLOROTHIAZIDE 25 MG: 25 TABLET ORAL at 13:01

## 2022-12-19 RX ADMIN — ENOXAPARIN SODIUM 40 MG: 100 INJECTION SUBCUTANEOUS at 09:30

## 2022-12-19 RX ADMIN — ASPIRIN 81 MG: 81 TABLET, COATED ORAL at 09:30

## 2022-12-19 RX ADMIN — CLOPIDOGREL BISULFATE 75 MG: 75 TABLET ORAL at 09:30

## 2022-12-19 RX ADMIN — LOSARTAN POTASSIUM 100 MG: 100 TABLET, FILM COATED ORAL at 09:31

## 2022-12-19 NOTE — DISCHARGE INSTRUCTIONS
Influenza (Flu): Care Instructions  Overview     Influenza (flu) is an infection in the lungs and breathing passages. It is caused by the influenza virus. There are different strains, or types, of the flu virus from year to year. Unlike the common cold, the flu comes on suddenly and the symptoms can be more severe. These symptoms include a cough, congestion, fever, chills, fatigue, aches, and pains. These symptoms may last for a few weeks. Although the flu can make you feel very sick, it usually doesn't cause serious health problems. Home treatment is usually all you need for flu symptoms. But your doctor may prescribe antiviral medicine to prevent other health problems, such as pneumonia, from developing. The risk of other health problems from the flu is highest for young children (under 2), older adults (over 72), pregnant women, and people with long-term health conditions. Follow-up care is a key part of your treatment and safety. Be sure to make and go to all appointments, and call your doctor if you are having problems. It's also a good idea to know your test results and keep a list of the medicines you take. How can you care for yourself at home? Get plenty of rest.  Drink plenty of fluids. If you have to limit fluids because of a health problem, talk with your doctor before you increase the amount of fluids you drink. Take an over-the-counter pain medicine if needed, such as acetaminophen (Tylenol), ibuprofen (Advil, Motrin), or naproxen (Aleve), to relieve fever, headache, and muscle aches. Be safe with medicines. Read and follow all instructions on the label. No one younger than 20 should take aspirin. It has been linked to Reye syndrome, a serious illness. Take any prescribed medicine exactly as directed. Do not smoke. Smoking can make the flu worse. If you need help quitting, talk to your doctor about stop-smoking programs and medicines.  These can increase your chances of quitting for good.  If the skin around your nose and lips becomes sore, put some petroleum jelly (such as Vaseline) on the area. To ease coughing:  Suck on cough drops or plain, hard candy. Try an over-the-counter cough or cold medicine. Read and follow all instructions on the label. Raise your head at night with an extra pillow. This may help you rest if coughing keeps you awake. To avoid spreading the flu  Wash your hands regularly, and keep your hands away from your face. Stay home from school, work, and other public places until you are feeling better and your fever has been gone for at least 24 hours. The fever needs to have gone away on its own without the help of medicine. Ask people living with you to talk to their doctors about preventing the flu. They may get antiviral medicine to keep from getting the flu from you. To prevent the flu in the future, get the flu vaccine every fall. Encourage people living with you to get the vaccine. Cover your mouth when you cough or sneeze. If you can, cough or sneeze into the bend of your elbow, not your hands. When should you call for help? Call 911 anytime you think you may need emergency care. For example, call if:    You have severe trouble breathing. You have a seizure. Call your doctor now or seek immediate medical care if:    You have trouble breathing. You have a fever with a stiff neck or a severe headache. You have pain or pressure in your chest or belly. You have a fever or cough that returns after getting better. You feel very sleepy, dizzy, or confused. You are not urinating. You have severe muscle pain. You have severe weakness, or you are unsteady. You have medical conditions that are getting worse. Watch closely for changes in your health, and be sure to contact your doctor if:    You do not get better as expected. You are having a problem with your medicine. Where can you learn more?   Go to http://www.[employer]/ and enter R630 to learn more about \"Influenza (Flu): Care Instructions. \"  Current as of: February 9, 2022               Content Version: 13.5  © 0390-1398 [employer]. Care instructions adapted under license by [facility] [place] ([facility]). If you have questions about a medical condition or this instruction, always ask your healthcare professional. [address] [address] any warranty or liability for your use of this information.

## 2022-12-19 NOTE — PROGRESS NOTES
Reviewed discharge instructions with pt, she verbalizes understanding of instructions. [facility] outpt pharmacy brought her home meds up to pt and reviewed with her. Pt's iv removed. Awaiting Family to arrive as they will be transporting her home. Pt has belongings. Family arrived at 65, called for transport to main lobby.

## 2022-12-19 NOTE — PLAN OF CARE
Problem: Discharge Planning  Goal: Discharge to home or other facility with appropriate resources  Outcome: Progressing  Flowsheets (Taken 12/19/2022 0116)  Discharge to home or other facility with appropriate resources:   Identify barriers to discharge with patient and caregiver   Arrange for needed discharge resources and transportation as appropriate   Identify discharge learning needs (meds, wound care, etc)   Refer to discharge planning if patient needs post-hospital services based on physician order or complex needs related to functional status, cognitive ability or social support system     Problem: Pain  Goal: Verbalizes/displays adequate comfort level or baseline comfort level  Outcome: Progressing  Flowsheets (Taken 12/19/2022 0116)  Verbalizes/displays adequate comfort level or baseline comfort level:   Encourage patient to monitor pain and request assistance   Assess pain using appropriate pain scale   Administer analgesics based on type and severity of pain and evaluate response   Implement non-pharmacological measures as appropriate and evaluate response  Note: Voltaran gel applied to NICOL. Patient voices improvement in pain/discomfort lever.       Problem: Safety - Adult  Goal: Free from fall injury  Outcome: Progressing  Flowsheets  Taken 12/19/2022 0116  Free From Fall Injury: Instruct family/caregiver on patient safety  Taken 12/19/2022 0112  Free From Fall Injury: Instruct family/caregiver on patient safety     Problem: Neurosensory - Adult  Goal: Achieves stable or improved neurological status  Outcome: Adequate for Discharge     Problem: Musculoskeletal - Adult  Goal: Return mobility to safest level of function  Outcome: Progressing  Flowsheets (Taken 12/19/2022 0116)  Return Mobility to Safest Level of Function:   Assess patient stability and activity tolerance for standing, transferring and ambulating with or without assistive devices   Assist with transfers and ambulation using safe patient handling equipment as needed     Problem: Musculoskeletal - Adult  Goal: Maintain proper alignment of affected body part  Outcome: Progressing  Flowsheets (Taken 12/19/2022 0116)  Maintain proper alignment of affected body part: Support and protect limb and body alignment per provider's orders     Problem: Gastrointestinal - Adult  Goal: Minimal or absence of nausea and vomiting  Outcome: Completed     Problem: Infection - Adult  Goal: Absence of infection at discharge  Outcome: Progressing  Flowsheets (Taken 12/19/2022 0116)  Absence of infection at discharge:   Assess and monitor for signs and symptoms of infection   Monitor lab/diagnostic results   Monitor all insertion sites i.e., indwelling lines, tubes and drains   Administer medications as ordered   Instruct and encourage patient and family to use good hand hygiene technique   Identify and instruct in appropriate isolation precautions for identified infection/condition  Note: FLU Isolation in place     Problem: Metabolic/Fluid and Electrolytes - Adult  Goal: Electrolytes maintained within normal limits  Outcome: Progressing  Flowsheets (Taken 12/19/2022 0116)  Electrolytes maintained within normal limits:   Monitor labs and assess patient for signs and symptoms of electrolyte imbalances   Administer electrolyte replacement as ordered     Problem: Chronic Conditions and Co-morbidities  Goal: Patient's chronic conditions and co-morbidity symptoms are monitored and maintained or improved  Outcome: Progressing  Flowsheets (Taken 12/19/2022 0116)  Care Plan - Patient's Chronic Conditions and Co-Morbidity Symptoms are Monitored and Maintained or Improved:   Monitor and assess patient's chronic conditions and comorbid symptoms for stability, deterioration, or improvement   Collaborate with multidisciplinary team to address chronic and comorbid conditions and prevent exacerbation or deterioration   Update acute care plan with appropriate goals if chronic or comorbid symptoms are exacerbated and prevent overall improvement and discharge     Problem: Skin/Tissue Integrity  Goal: Absence of new skin breakdown  Description: 1. Monitor for areas of redness and/or skin breakdown  2. Assess vascular access sites hourly  3. Every 4-6 hours minimum:  Change oxygen saturation probe site  4. Every 4-6 hours:  If on nasal continuous positive airway pressure, respiratory therapy assess nares and determine need for appliance change or resting period. Outcome: Progressing  Note: See flowsheets for skin integrity documentation. Problem: ABCDS Injury Assessment  Goal: Absence of physical injury  Outcome: Progressing  Flowsheets (Taken 12/19/2022 0116)  Absence of Physical Injury: Implement safety measures based on patient assessment  Care plan reviewed with patient. Patient verbalizes understanding of the plan of care and contribute to goal setting.

## 2022-12-19 NOTE — PROGRESS NOTES
[address]  INPATIENT PHYSICAL THERAPY  DAILY NOTE  [facility] ORTHOPEDICS 7K - 7K-02/002-A    Time In: 0009  Time Out: 2801  Timed Code Treatment Minutes: 24 Minutes  Minutes: 24          Date: 2022  Patient Name: Richard Das,  Gender:  female        MRN: [id number]  : 1950  (71 y.o.)     Referring Practitioner: Beth Patiño PA-C  Diagnosis: dizziness  Additional Pertinent Hx: Per H&P 12/15: Patient transferred from [facility] for further evaluation of vertigo. The patient reports that one day prior to admission she started having \"dizziness\" that she best describes as the room spinning. The patient denies any focal deficits. She reports some changes in her vision - difficulty focusing. She did have a fall at home and struck her left side. She also complains of a headache. Patient is admitted for further evaluation and stroke rule out. MRI brain, and CTA of head and neck ordered. CT of head negative for acute abnormality. She presents with influenza A.. 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 X ray L humerus negative for fracture. Prior Level of Function:  Lives With: Family (grandson)  Type of Home: House  Home Layout: One level  Home Access: Stairs to enter with rails  Entrance Stairs - Number of Steps: 3  Entrance Stairs - Rails: Left  Home Equipment: Walker, rolling   Bathroom Shower/Tub: Tub/Shower unit    ADL Assistance: [address] [place] [address] Avenue: Independent  Homemaking Responsibilities: Yes  Ambulation Assistance: Independent  Transfer Assistance: Independent  Active : Yes  Additional Comments: she is normally indep with mobility. 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 She has assit with cleaning at home. Her grandson works outside of the home second shift.     Restrictions/Precautions:  Restrictions/Precautions: Isolation, Fall Risk, General Precautions  Position Activity Restriction  Other position/activity restrictions: droplet precautions- flu, monitor orthos     SUBJECTIVE: RN approved session. Patient in bed at arrival and pleasantly agrees to session. PAIN: Yes, L UE however did not rate. Vitals: Vitals not assessed per clinical judgement, see nursing flowsheet    OBJECTIVE:  Bed Mobility:  Supine to Sit: Stand By Assistance    Transfers:  Sit to Stand: Stand By Assistance, Contact Guard Assistance  Stand to Sit:Stand By Assistance, Contact Guard Assistance    Ambulation:  Stand By Assistance, Contact Guard Assistance  Distance: ~35' in room   Surface: Level Tile  Device:Rolling Walker  Gait Deviations:  Slow Liane, Decreased Step Length Bilaterally, Decreased Gait Speed, and Mild Path Deviations    Exercise:  Patient was guided in 1 set(s) 15 reps of exercise to both lower extremities. Ankle pumps, Glut sets, Quad sets, Heelslides, Hip abduction/adduction, Straight leg raises, Seated marches, Seated heel/toe raises, and Long arc quads. Exercises were completed for increased independence with functional mobility. Functional Outcome Measures: Completed  AM-PAC Inpatient Mobility without Stair Climbing Raw Score : 15  AM-PAC Inpatient without Stair Climbing T-Scale Score : 43.03    ASSESSMENT:  Assessment: Patient progressing toward established goals. Activity Tolerance:  Patient tolerance of  treatment: good.       Equipment Recommendations:Equipment Needed: No  Discharge Recommendations: Home with Assist as Needed, Home with Home Health PT, Home with Outpatient PT, and Patient would benefit from continued PT at discharge  Plan: Current Treatment Recommendations: Strengthening, Balance training, Functional mobility training, Transfer training, Gait training, Endurance training, Neuromuscular re-education, Patient/Caregiver education & training, Safety education & training, Home exercise program, Equipment evaluation, education, & procurement, Therapeutic activities  General Plan:  (5-6x GM/N)    Patient Education  Patient Education: Plan of Care    Goals:  Patient Goals : to walk straight  Short Term Goals  Time Frame for Short Term Goals: by hospital d/c  Short Term Goal 1: Pt to complete supine <->sit indep for ease with getting in and out of bed  Short Term Goal 2: Pt to complete sit <->stand with RW and S for safe transfers  Short Term Goal 3: Pt to ambulate >=50' with RW and S to progress towards her PLOF  Short Term Goal 4: Pt to ascend/descend 3 steps with uni HR for safe home entry with S  Long Term Goals  Time Frame for Long Term Goals : NA due to short ELOS    Following session, patient left in safe position with all fall risk precautions in place.

## 2022-12-19 NOTE — PLAN OF CARE
Problem: Discharge Planning  Goal: Discharge to home or other facility with appropriate resources  12/19/2022 1347 by Mari Madden RN  Outcome: Adequate for Discharge  Flowsheets (Taken 12/19/2022 4432)  Discharge to home or other facility with appropriate resources:   Identify barriers to discharge with patient and caregiver   Arrange for needed discharge resources and transportation as appropriate     Problem: Pain  Goal: Verbalizes/displays adequate comfort level or baseline comfort level  12/19/2022 1347 by Mari Madden RN  Outcome: Adequate for Discharge     Problem: Safety - Adult  Goal: Free from fall injury  12/19/2022 1347 by Mari Madden RN  Outcome: Adequate for Discharge  Flowsheets (Taken 12/19/2022 1043 by Yassine Noriega LPN)  Free From Fall Injury: Instruct family/caregiver on patient safety  Call light within reach. Bed alarm on.       Problem: Neurosensory - Adult  Goal: Achieves stable or improved neurological status  12/19/2022 1347 by Mari Madden RN  Outcome: Adequate for Discharge  Flowsheets (Taken 12/19/2022 0929)  Achieves stable or improved neurological status: Assess for and report changes in neurological status     Problem: Musculoskeletal - Adult  Goal: Return mobility to safest level of function  12/19/2022 1347 by Mari Madden RN  Outcome: Adequate for Discharge  Flowsheets (Taken 12/19/2022 0929)  Return Mobility to Safest Level of Function:   Assess patient stability and activity tolerance for standing, transferring and ambulating with or without assistive devices   Assist with transfers and ambulation using safe patient handling equipment as needed     Problem: Gastrointestinal - Adult  Goal: Minimal or absence of nausea and vomiting  Recent Flowsheet Documentation  Taken 12/19/2022 0929 by Mari Madden RN  Minimal or absence of nausea and vomiting: Administer IV fluids as ordered to ensure adequate hydration     Problem: Infection - Adult  Goal: Absence of infection at discharge  12/19/2022 1347 by Brynn Gomez RN  Outcome: Adequate for Discharge  Flowsheets (Taken 12/19/2022 2374)  Absence of infection at discharge:   Assess and monitor for signs and symptoms of infection   Monitor lab/diagnostic results     Problem: Metabolic/Fluid and Electrolytes - Adult  Goal: Electrolytes maintained within normal limits  12/19/2022 1347 by Brynn Gomez RN  Outcome: Adequate for Discharge  Flowsheets (Taken 12/19/2022 0929)  Electrolytes maintained within normal limits:   Monitor labs and assess patient for signs and symptoms of electrolyte imbalances   Administer electrolyte replacement as ordered     Problem: Chronic Conditions and Co-morbidities  Goal: Patient's chronic conditions and co-morbidity symptoms are monitored and maintained or improved  12/19/2022 1347 by Brynn Gomez RN  Outcome: Adequate for Discharge  Flowsheets (Taken 12/19/2022 0929)  Care Plan - Patient's Chronic Conditions and Co-Morbidity Symptoms are Monitored and Maintained or Improved:   Monitor and assess patient's chronic conditions and comorbid symptoms for stability, deterioration, or improvement   Collaborate with multidisciplinary team to address chronic and comorbid conditions and prevent exacerbation or deterioration     Problem: Skin/Tissue Integrity  Goal: Absence of new skin breakdown  Description: 1. Monitor for areas of redness and/or skin breakdown  Outcome: Adequate for Discharge     Problem: ABCDS Injury Assessment  Goal: Absence of physical injury  12/19/2022 1347 by Brynn Gomez RN  Outcome: Adequate for Discharge  Flowsheets (Taken 12/19/2022 1043 by Charlie Soria LPN)  Absence of Physical Injury: Implement safety measures based on patient assessment  Care plan reviewed with patient. Patient verbalize understanding of the plan of care and contribute to goal setting. Livia Marie

## 2022-12-19 NOTE — TELEPHONE ENCOUNTER
Left message for patient to call office to schedule new patient appointment for hospital referral for vertigo, stroke.

## 2022-12-19 NOTE — DISCHARGE SUMMARY
DISCHARGE      Patient:  Pollo Ulloa      Unit/Bed:7K-02/002-A    YOB: 1950    MRN: [id number]       Acct: [de-identified]     PCP: REBECA Greco CNP    Date of Admission: 12/14/2022      Assessment/Plan:    Anticipated Discharge in : 2-3 days    Active Hospital Problems    Diagnosis Date Noted    Intractable nausea and vomiting [R11.2] 12/16/2022     Priority: Medium    Vertigo [R42] 12/15/2022     Priority: Medium     Acute CVA  Noted on recent MRI  Neurology has been consulted and has signed off  Continue ASA, plavix, lipitor  Normal CTA Head/Neck  Normal echo  A1C resulted as below  Lipid Panel as below  PT/OT has seen patient, to get home health vs. IPR  Has been eating well, cleared by speech consult       Central Vertigo:    Did have positive orthostatics   MRI brain showed infarcts as above  Labyrinthitis also possible contributing factor   Meclizine prn  Symptoms improving but continue, being evaluated for possible IPR stay for this on 12/19    Essential hypertension:   Permissive hypertension for now  Patient states her oral agents were all recently stopped because her blood pressures were normal   Continue losartan   Continue further control if not worsening. HLD   On lipitor 80mg currently, has not been on it   ASCVD of 18%   LDL at 133, not at goal    NIDDM II:   Low dose sliding scale  Hypoglycemic treatment orders  ADA diet  7.4 A1C  Should increase metformin on Discharge  Also to begin Jardiance on discharge     Influenza A  Supportive care  On room air  Some nausea from this  PRN zofran and gentle IVF    Left arm Pain   Did fall on arm before admission   PRN tylenol   Xray normal   Given heating pad and voltaren gel    Memory Difficulties   Of note, patient is a current participant in a clinical trial for Alzheimer disease.     Elevated Alk Phos   Noted, will have outpatient followup     Normocytic Anemia   Noted on admission, unclear cause   Iron studies WNL        Chief Complaint: Dizziness    Hospital Course:   Per HPI  \" Patient transferred from [facility] for further evaluation of vertigo. The patient reports that one day prior to admission she started having \"dizziness\" that she best describes as the room spinning. The patient denies any focal deficits. She reports some changes in her vision - difficulty focusing. She also complains of a headache. Patient is admitted for further evaluation and stroke rule out. \"    Since admitted, MRI did confirm CVA. Neurology consulted. Pending CTA and echo. Followed core measures. Vertigo was improving. Also found to be flu positive and giving supportive care, no shortness of air noted. Blood pressures have been elevated 2/2 CVA, not on any medications at home, considering losartan at discharge. A1c elevated to 7.4, plan to increase metformin and begin jardiance at discharge. Otherwise patient is doing well.     [phone]  Patient doing well this morning. No acute events overnight. Has been ambulating and eating well. No worsening syncope or dizziness. No chest pain or shortness of air. No changes to bowel or bladder habits. No weakness or visual changes. Diarrhea has resolved since yesterday     12.17.2022 patient seen this a.m. no complaints states she does feel weak though and would like to go to rehab. Also adds that she is less dizzy today. Rehab consult placed yesterday. We will adjust medications to control blood pressure will DC IV fluids, start losartan for blood pressure control and renal protection secondary to diabetes. Humerus x-ray negative for fracture.     12/18/2022 -No acute events, pending eval    12.19.2022 patient seen this a.m. doing well walking in room medically stable we will add hydrochlorothiazide 25 mg to her hypertension regime and have her follow-up with PCP, also will add Zetia 10 mg to her Lipitor 80 mg to get her to her LDL goal.  Medically stable for discharge      Medications:  Reviewed    Infusion Medications    dextrose       Scheduled Medications    ezetimibe  10 mg Oral Nightly    hydroCHLOROthiazide  25 mg Oral Daily    losartan  100 mg Oral Daily    clopidogrel  75 mg Oral Daily    enoxaparin  40 mg SubCUTAneous Daily    aspirin  81 mg Oral Daily    Or    aspirin  300 mg Rectal Daily    atorvastatin  80 mg Oral Nightly    insulin lispro  0-4 Units SubCUTAneous TID WC    insulin lispro  0-4 Units SubCUTAneous Nightly     PRN Meds: diclofenac sodium, HYDROcodone 5 mg - acetaminophen **OR** HYDROcodone 5 mg - acetaminophen, ondansetron **OR** ondansetron, polyethylene glycol, acetaminophen **OR** acetaminophen, glucose, dextrose bolus **OR** dextrose bolus, glucagon (rDNA), dextrose      Intake/Output Summary (Last 24 hours) at 12/19/2022 1125  Last data filed at 12/19/2022 0608  Gross per 24 hour   Intake 545 ml   Output --   Net 545 ml         Diet:  ADULT DIET; Easy to Chew; 4 carb choices (60 gm/meal); Low Fat/Low Chol/High Fiber/NASIR    Exam:  BP (!) 156/74   Pulse 72   Temp 98.8 °F (37.1 °C) (Oral)   Resp 16   Ht 5' 3.5\" (1.613 m)   Wt 165 lb 9.1 oz (75.1 kg)   SpO2 96%   BMI 28.87 kg/m²     Physical Exam  Vitals and nursing note reviewed. Constitutional:       General: She is not in acute distress. HENT:      Head: Normocephalic and atraumatic. Nose: Nose normal.      Mouth/Throat:      Mouth: Mucous membranes are moist.      Pharynx: Oropharynx is clear. Eyes:      Extraocular Movements: Extraocular movements intact. Pupils: Pupils are equal, round, and reactive to light. Cardiovascular:      Rate and Rhythm: Normal rate and regular rhythm. Pulses: Normal pulses. Heart sounds: Normal heart sounds. Pulmonary:      Effort: Pulmonary effort is normal.      Breath sounds: Normal breath sounds. Abdominal:      Palpations: Abdomen is soft. Tenderness: There is no abdominal tenderness.    Musculoskeletal: General: No signs of injury. Normal range of motion. Cervical back: Normal range of motion and neck supple. Skin:     General: Skin is warm and dry. Capillary Refill: Capillary refill takes less than 2 seconds. Neurological:      General: No focal deficit present. Mental Status: She is alert and oriented to person, place, and time. Mental status is at baseline. Psychiatric:         Mood and Affect: Mood normal.         Behavior: Behavior normal.       Labs:   Recent Labs     12/17/22  0538 12/19/22  0719   WBC 4.8 7.1   HGB 10.9* 12.1   HCT 33.6* 37.0    225       Recent Labs     12/17/22  0538 12/19/22  0719    140   K 4.0 4.0    103   CO2 27 26   BUN 18 17   CREATININE 1.2 1.2   CALCIUM 8.3* 9.1       No results for input(s): AST, ALT, BILIDIR, BILITOT, ALKPHOS in the last 72 hours. No results for input(s): INR in the last 72 hours. No results for input(s): Steven Ebbs in the last 72 hours. Urinalysis:    No results found for: Channie Christopher, BACTERIA, RBCUA, BLOODU, SPECGRAV, Victor [address] [place] [address]    Radiology:  XR HUMERUS LEFT (MIN 2 VIEWS)   Final Result   No fracture or acute bony abnormality visualized. Chronic degenerative changes are noted. **This report has been created using voice recognition software. It may contain minor errors which are inherent in voice recognition technology. **      Final report electronically signed by Dr Sharan Langford on 12/16/2022 2:30 PM      CTA NECK W WO CONTRAST   Final Result    Normal CTA of the head and neck. **This report has been created using voice recognition software. It may contain minor errors which are inherent in voice recognition technology. **      Final report electronically signed by Dr. Ivana Dove on 12/16/2022 10:37 AM      CTA HEAD W WO CONTRAST   Final Result    Normal CTA of the head and neck. **This report has been created using voice recognition software.  It may contain minor errors which are inherent in voice recognition technology. **      Final report electronically signed by Dr. Augie Goode on 12/16/2022 10:37 AM      MRI brain without contrast   Final Result   Impression:   1. Two very small acute infarcts; one is at the anterior left temporal    lobe and the other is in the left cerebellar peduncle. 2. No acute intracranial hemorrhage. 3. Volume loss with cerebral white matter disease, likely ischemic    microvascular in nature. This document has been electronically signed by: Teodoro Quinonez MD on    12/15/2022 08:37 PM         CT HEAD WO CONTRAST   Final Result   1. No acute intracranial process. 2. Minimal changes of chronic microvascular ischemic disease. This document has been electronically signed by: Ayla Hanks DO on    12/15/2022 03:26 AM      All CTs at this facility use dose modulation techniques and iterative    reconstructions, and/or weight-based dosing   when appropriate to reduce radiation to a low as reasonably achievable. Diet: ADULT DIET; Easy to Chew; 4 carb choices (60 gm/meal);  Low Fat/Low Chol/High Fiber/NASIR    DVT prophylaxis: [x] Lovenox                                 [] SCDs                                 [] SQ Heparin                                 [] Encourage ambulation           [] Already on Anticoagulation     Disposition:    [x] Home       [] TCU       [] Rehab       [] Psych       [] SNF       [] [place]       [] Other-    Code Status: Full Code    PT/OT Eval Status: Ordered       Electronically signed by Elana Henning DO on 12/19/2022 at 11:25 AM

## 2022-12-19 NOTE — PROGRESS NOTES
[facility]  Occupational Therapy  Daily Note  Time:   Time In: 1293  Time Out: 1126  Timed Code Treatment Minutes: 38 Minutes  Minutes: 38          Date: 2022  Patient Name: Sonya Frias,   Gender: female      Room: [facility]  MRN: [id number]  : 1950  (71 y.o.)  Referring Practitioner: Sara Vargas PA-C  Diagnosis: vertigo  Additional Pertinent Hx: Patient transferred from [facility] for further evaluation of vertigo. The patient reports that Tuesday prior to admission she started having \"dizziness\" that she best describes as the room spinning. Later  night pt was taking a bath and when she got out of the tub she felt lightheaded and fell. She reports some changes in her vision - difficulty focusing. She also complains of a headache. DX: vertigo vs. orthostatic hypotension. MRI ordered to rule of CVA    Restrictions/Precautions:  Restrictions/Precautions: Isolation, Fall Risk, General Precautions  Position Activity Restriction  Other position/activity restrictions: droplet precautions- flu, monitor orthos     SUBJECTIVE: Pt sitting in recliner upon arrival, pt agreeable to OT session, RN gave verbal approval for session    PAIN: 0/10:     Vitals: Nurse checked vitals prior to session    COGNITION: Impaired Memory    ADL:   Grooming: Stand By Assistance. With pt standing at the sink to brush hair  Bathing: Stand By Assistance. With pt completing shower with pt sitting on shower bench and standing  Upper Extremity Dressing: Stand By Assistance. With donning gown  Lower Extremity Dressing: Stand By Assistance. With donning brief and socks  Shower Transfer: Stand By Assistance. Aneta Mao BALANCE:  Standing Balance: Stand By Assistance. With RW, and BUE release from the walker during ADL routine    BED MOBILITY:  Not Tested    TRANSFERS:  Sit to Stand:  Stand By Assistance. From the recliner  Stand to Sit: Stand By Assistance.  Back to the recliner    FUNCTIONAL MOBILITY:  Assistive Device: Rolling Walker  Assist Level:  Stand By Assistance. Distance:  HH distances     ASSESSMENT:     Activity Tolerance:  Patient tolerance of  treatment: good. Discharge Recommendations: Home with Home Health OT  Equipment Recommendations: Equipment Needed: Yes  Other: Pt has a walker. Discussed importance of using a shower chair in shower instead of sitting in tub for bath secondary to positive orthostatic hypotension adn risk for falls. Pt verbalizes understanding. Plan: Times Per Week: 5x  Times Per Day: Once a day  Current Treatment Recommendations: Strengthening, Balance training, Functional mobility training, Neuromuscular re-education, Self-Care / ADL, Home management training, Safety education & training, Endurance training, Equipment evaluation, education, & procurement    Patient Education  Patient Education: ADL's    Goals  Short Term Goals  Time Frame for Short Term Goals: until discharge  Short Term Goal 1: Pt will safely navigate to/from bathroom with (S) without s/s of drop in BP. Short Term Goal 2: Pt will demo good self monitoring skills of BP upon standing to prevent risk for falls as a result of orthostatic hypotension. Short Term Goal 3: Pt will complete UB/LB bathing/dressing with (S) and good safety awareness. Short Term Goal 4: Pt will tolerate moderate resistive UB HEP with minimal cues for technique to improve UB strength for ADLs. Following session, patient left in safe position with all fall risk precautions in place.

## 2022-12-19 NOTE — PROGRESS NOTES
[facility] CONSULT    Referral Type: internal    Patient Name: Nuria Leavitt      MRN: [id number]    : 1950  (76 y.o.)  Gender: female   Race:White (non-)     Payor Source: Payor: Alisia Gonsales / Plan: Monda Flaming ESSENTIAL/PLUS / Product Type: *No Product type* /   Secondary Payor Source:      Isolation Status: Droplet    Lives With: Family (grandson)  Type of Home: House  Home Layout: One level  Home Access: Stairs to enter with rails  Entrance Stairs - Number of Steps: 3     Occupation: Part time employment  Type of Occupation: works at [employer] 2x a week  Additional Comments: she is normally indep with mobility. 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 She has assit with cleaning at home. Her grandson works outside of the home second shift. What is treatment plan? Disciplines Required upon Admission to Inpatient Rehabilitation: Physical Therapy, Occupational Therapy, and Speech Therapy  Post operative: No  Fall: Yes  Dialysis: No  Diet: ADULT DIET; Easy to Chew; 4 carb choices (60 gm/meal); Low Fat/Low Chol/High Fiber/NASIR  Discussed patient with  and PM&R provider:  Josie Liu CM updated on PM&R note from 2022 stating that   Intensive inpatient rehabilitation intervention is not indicated. Further rehab intervention with home-based PT and OT treatment provided by home health care service, or outpatient PT and OT referral is recommended as discharge rehabilitation plan.

## 2022-12-22 LAB — ALKALINE PHOSPHATASE ISOENZYMES: NORMAL

## 2023-02-07 ENCOUNTER — OFFICE VISIT (OUTPATIENT)
Dept: NEUROLOGY | Age: 73
End: 2023-02-07

## 2023-02-07 VITALS
HEART RATE: 94 BPM | DIASTOLIC BLOOD PRESSURE: 82 MMHG | BODY MASS INDEX: 26.12 KG/M2 | SYSTOLIC BLOOD PRESSURE: 148 MMHG | WEIGHT: 153 LBS | HEIGHT: 64 IN | OXYGEN SATURATION: 97 %

## 2023-02-07 DIAGNOSIS — I63.10 CEREBROVASCULAR ACCIDENT (CVA) DUE TO EMBOLISM OF PRECEREBRAL ARTERY (HCC): Primary | ICD-10-CM

## 2023-02-07 DIAGNOSIS — I63.89 OTHER CEREBRAL INFARCTION (HCC): ICD-10-CM

## 2023-02-07 DIAGNOSIS — I67.858 OTHER HEREDITARY CEREBROVASCULAR DISEASE: ICD-10-CM

## 2023-02-07 NOTE — PATIENT INSTRUCTIONS
Continue with dual antiplatelets  Continue with lipid lowering mediation, target LDL below 70   30 day cardiac event monitor  Homocystine level  Modify risk factors for stroke (blood pressure, cholesterol, diabetes, smoking cessation etc.. Control)  Call with any new symptoms or concerns.    Follow up as needed

## 2023-02-15 ENCOUNTER — TELEPHONE (OUTPATIENT)
Dept: NEUROLOGY | Age: 73
End: 2023-02-15

## 2023-02-15 RX ORDER — FOLIC ACID 1 MG/1
1 TABLET ORAL DAILY
Qty: 90 TABLET | Refills: 1 | Status: SHIPPED | OUTPATIENT
Start: 2023-02-15

## 2023-02-15 NOTE — TELEPHONE ENCOUNTER
----- Message from REBECA Echavarria CNP sent at 2/15/2023  8:50 AM EST -----  Please let patient know her homocystine level is elevated=20.3. She needs to start on folic acid 1 mg daily.   Will send script to Sindy Allen., CNP

## 2023-02-16 ENCOUNTER — HOSPITAL ENCOUNTER (OUTPATIENT)
Dept: NON INVASIVE DIAGNOSTICS | Age: 73
Discharge: HOME | End: 2023-02-16
Payer: MEDICARE

## 2023-02-16 DIAGNOSIS — I67.858 OTHER HEREDITARY CEREBROVASCULAR DISEASE: ICD-10-CM

## 2023-02-16 DIAGNOSIS — I63.10 CEREBROVASCULAR ACCIDENT (CVA) DUE TO EMBOLISM OF PRECEREBRAL ARTERY (HCC): ICD-10-CM

## 2023-02-16 PROCEDURE — 93270 REMOTE 30 DAY ECG REV/REPORT: CPT

## 2023-02-16 NOTE — PROCEDURES
The skin was prepped and a 30 day cardiac event monitor was applied. The patient was instructed on the documentation of symptoms and the purpose of the monitor as well as the things to avoid while wearing the monitor. The patient was instructed to remove and return the monitor on 03/18/2023.   The serial number of the monitor that was applied is [id number]

## 2023-02-19 NOTE — PROGRESS NOTES
Chief Complaint   Patient presents with    Consultation     NP consult vertigo, stroke            Alejandro Mejia is a 68 y.o. female who presents today for evaluation of left anterior temporal lobe and left cerebellar peduncle ischemic stroke in December 2022. Initial presenting symptoms was dizziness,. MRI brain done 12/15/22 showed Two very small acute infarcts; one is at the anterior left temporal lobe and the other is in the left cerebellar peduncle. CTA head and neck done 12/16/22 showed no concerning findings. She is diabetic for the past 1 year, well controlled. She  was started on aspirin and plavix. Her sleep is poor and interrupted, she wakes up well rested. She does not take daytime naps. She denies chest pain. No shortness of breath, no neck pain. No vision changes. No dysphagia. No fever. No rash. No weight loss. History provided by patient. Past Medical History:   Diagnosis Date    Cerebral artery occlusion with cerebral infarction ([facility] [address]     Diabetes mellitus ([facility] 01/2022    Hypertension 01/2022       Patient Active Problem List   Diagnosis    Vertigo    Intractable nausea and vomiting       Allergies   Allergen Reactions    Other      Medication given for a urinary infection, allergic to it, passed out. Current Outpatient Medications   Medication Sig Dispense Refill    aspirin 81 MG EC tablet Take 1 tablet by mouth daily 30 tablet 3    atorvastatin (LIPITOR) 80 MG tablet Take 1 tablet by mouth nightly 30 tablet 3    clopidogrel (PLAVIX) 75 MG tablet Take 1 tablet by mouth daily 30 tablet 3    ezetimibe (ZETIA) 10 MG tablet Take 1 tablet by mouth nightly 30 tablet 3    hydroCHLOROthiazide (HYDRODIURIL) 25 MG tablet Take 1 tablet by mouth daily 30 tablet 3    losartan (COZAAR) 100 MG tablet Take 1 tablet by mouth daily 30 tablet 3    metFORMIN (GLUCOPHAGE) 500 MG tablet Take 250 mg by mouth 2 times daily (with meals)       No current facility-administered medications for this visit. Social History     Socioeconomic History    Marital status: Single     Spouse name: None    Number of children: None    Years of education: None    Highest education level: None   Tobacco Use    Smoking status: Never     Passive exposure: Never    Smokeless tobacco: Never   Vaping Use    Vaping Use: Never used   Substance and Sexual Activity    Alcohol use: Never    Drug use: Never    Sexual activity: Never       Family History   Problem Relation Age of Onset    Uterine Cancer Mother     Alzheimer's Disease Father     Cancer Brother         Cancer in the mouth    Cancer Brother         Cancer in the mouth    Prostate Cancer Brother     Breast Cancer Maternal Aunt     Breast Cancer Maternal Aunt     Alzheimer's Disease Paternal Aunt     Alzheimer's Disease Paternal Aunt          I reviewed the past medical history, allergies, medications, social history and family history. Review of Systems   All systems reviewed, and are all negative, except what is mentioned in HPI      Vitals:    02/07/23 1344   BP: (!) 148/82   Site: Right Upper Arm   Position: Sitting   Cuff Size: Medium Adult   Pulse: 94   SpO2: 97%   Weight: 153 lb (69.4 kg)   Height: 5' 4\" (1.626 m)       Physical Examination:  General appearance - alert, well appearing, and in no distress, oriented to person, place, and time and over weight  Mental status- Level of Alertness: awake  Orientation: person, place, time  Memory: normal  Fund of Knowledge: normal  Attention/Concentration: normal  Language: normal. Mood is normal.   Neck - supple, no significant adenopathy, carotids upstroke normal bilaterally. There is no axillary lymphadenopathy. There is no carotid bruit. No neck lymphadenopathy.  No thyroid enlargement   Neurological -   Cranial [id number]:.   Cranial nerve II: Normal   Cranial nerve III: Pupils: equal, round, reactive to light  Cranial nerves III, IV, VI: Extraocular Movements: intact   Cranial nerve V: Facial sensation: intact Cranial nerve VII:Facial strength: intact   Cranial nerve VIII: Hearing: intact    Cranial nerve IX: Palate Elevation intact bilaterally  Cranial nerve XI: Shoulder shrug intact bilaterally  Cranial nerve XII: Tongue midline   neck supple without rigidity, there is no limitation of range of motion of the neck. DTR's are decreased distal and symmetric  Babinski sign negative   Motor exam is 5/5 in the upper and lower extremities. Normal muscle tone. There is no muscle atrophy. Sensory is intact for light touch. Coordination: finger to nose,   intact  Gait and station intact  Abnormal movement none, vibration reduced distally  Skin - warm, dry to touch, normal coloration, no rashes, no suspicious skin lesions  Superficial temporal artery pulses are normal.   There is no limitation of range of motion of the neck. There is no resting tremor, no pin rolling, no bradykinesia, no Hypohonia, normal blink rate. Musculoskeletal: Has no hand arthritis, no limitation of ROM in any of the four extremities. There is no leg edema. The Heart was regular in rate and rhythm. No heart murmur  Chest Clear, with  good effort. Abdomen soft, intact bowel sounds. Results for orders placed during the hospital encounter of 12/14/22    CTA HEAD W WO CONTRAST    Narrative  PROCEDURE: CTA HEAD W WO CONTRAST, CTA NECK W WO CONTRAST    CLINICAL INFORMATION: dizziness, vertigo, vision changes, headaches. Recent fall. COMPARISON: Brain MRI 12/15/2022. TECHNIQUE: 1 mm axial images were obtained through the head and neck after the fast bolus administration of contrast. A noncontrast localizer was obtained. 3-D reconstructions were performed on a dedicated 3-D workstation. These include multiplanar MPR  images and multiplanar MIP images. Centerline reconstructions were obtained of the carotid systems. Isovue intravenous contrast was given.         All CT scans at this facility use dose modulation, iterative reconstruction, and/or weight-based dosing when appropriate to reduce radiation dose to as low as reasonably achievable. FINDINGS:      CTA NECK:    Aortic arch and branches: Aortic arch is within acceptable limits. There is some mild atherosclerosis of the proximal descending thoracic aorta. There is no stenosis of the common origin of innominate artery left common carotid artery. There is no  stenosis at the origin of either subclavian artery. Right common carotid artery/ICA: The right common carotid artery is normal. The right carotid bulb is normal. The right internal carotid artery is normal. There is no atherosclerosis. There is no stenosis. Left common carotid artery/ICA: The left common carotid artery is normal. The left carotid bulb is normal. The left internal carotid artery is normal. There is no atherosclerosis. There is no stenosis. Vertebral arteries: The vertebral arteries are normal bilaterally. The left vertebral artery is dominant. CTA HEAD:      Internal carotid arteries: Normal. The ophthalmic artery origins are also normal.    Middle cerebral arteries: Normal. The proximal branches are also normal.    Anterior cerebral arteries: Normal. The proximal branches are normal. There is a normal small anterior communicating artery. Vertebral arteries: The vertebral arteries are normal.    Basilar artery: Normal.    Superior cerebellar arteries: Normal.    Posterior cerebral arteries: There are hypoplastic P1 segments bilaterally. There are normal bilateral moderate-sized posterior to indicating arteries. There are bilateral normal P2 segments. No aneurysms, stenoses or occlusions are noted. The superior sagittal sinus, vein of Anselmo, internal cerebral veins, straight sinus, transverse sinuses and sigmoid sinuses are patent. Axial source data: There are no suspicious findings in the lung apices. There is no cervical adenopathy. There are no gross abnormalities in the brain.  There is some mucosal thickening in the ethmoid air cells. There are degenerative changes the cervical  spine. Impression  Normal CTA of the head and neck. **This report has been created using voice recognition software. It may contain minor errors which are inherent in voice recognition technology. **    Final report electronically signed by Dr. Siomara Barlow on 12/16/2022 10:37 AM    Results for orders placed during the hospital encounter of 12/14/22    CTA NECK W WO CONTRAST    Narrative  PROCEDURE: CTA HEAD W WO CONTRAST, CTA NECK W WO CONTRAST    CLINICAL INFORMATION: dizziness, vertigo, vision changes, headaches. Recent fall. COMPARISON: Brain MRI 12/15/2022. TECHNIQUE: 1 mm axial images were obtained through the head and neck after the fast bolus administration of contrast. A noncontrast localizer was obtained. 3-D reconstructions were performed on a dedicated 3-D workstation. These include multiplanar MPR  images and multiplanar MIP images. Centerline reconstructions were obtained of the carotid systems. Isovue intravenous contrast was given. All CT scans at this facility use dose modulation, iterative reconstruction, and/or weight-based dosing when appropriate to reduce radiation dose to as low as reasonably achievable. FINDINGS:      CTA NECK:    Aortic arch and branches: Aortic arch is within acceptable limits. There is some mild atherosclerosis of the proximal descending thoracic aorta. There is no stenosis of the common origin of innominate artery left common carotid artery. There is no  stenosis at the origin of either subclavian artery. Right common carotid artery/ICA: The right common carotid artery is normal. The right carotid bulb is normal. The right internal carotid artery is normal. There is no atherosclerosis. There is no stenosis. Left common carotid artery/ICA:  The left common carotid artery is normal. The left carotid bulb is normal. The left internal carotid artery is normal. There is no atherosclerosis. There is no stenosis. Vertebral arteries: The vertebral arteries are normal bilaterally. The left vertebral artery is dominant. CTA HEAD:      Internal carotid arteries: Normal. The ophthalmic artery origins are also normal.    Middle cerebral arteries: Normal. The proximal branches are also normal.    Anterior cerebral arteries: Normal. The proximal branches are normal. There is a normal small anterior communicating artery. Vertebral arteries: The vertebral arteries are normal.    Basilar artery: Normal.    Superior cerebellar arteries: Normal.    Posterior cerebral arteries: There are hypoplastic P1 segments bilaterally. There are normal bilateral moderate-sized posterior to indicating arteries. There are bilateral normal P2 segments. No aneurysms, stenoses or occlusions are noted. The superior sagittal sinus, vein of Anselmo, internal cerebral veins, straight sinus, transverse sinuses and sigmoid sinuses are patent. Axial source data: There are no suspicious findings in the lung apices. There is no cervical adenopathy. There are no gross abnormalities in the brain. There is some mucosal thickening in the ethmoid air cells. There are degenerative changes the cervical  spine. Impression  Normal CTA of the head and neck. **This report has been created using voice recognition software. It may contain minor errors which are inherent in voice recognition technology. **  Final report electronically signed by Dr. Nick Chavarria on 12/16/2022 10:37 AM    MRI brain without contrast (reviewed)    Narrative  * ADDENDUM #1   This report was discussed with Rudi Finney RN on Dec 15, 2022 20:41:00  EST.     This document has been electronically signed by: Nicole Client on  12/15/2022 08:41 PM   ORIGINAL REPORT   Exam: MRI of the brain without contrast    Comparison: 12/15/2022, 11/22/2022    Clinical history: Dizziness, vertigo, influenza A, intractable nausea and  vomiting. Findings:  Very small acute infarct seen at the anterior left temporal lobe on series  2 image 10. Very small acute infarct seen at the left cerebellar peduncle on series 2  image 7  The ventricles and sulci are prominent in size consistent with age-related  volume loss. No intracranial masses are identified. No midline shift. No acute intracranial hemorrhage. No abnormal extra-axial fluid collections are seen. Few small areas of increased FLAIR signal within the cerebral white matter  likely reflective of ischemic microvascular disease    Impression  Impression:  1. Two very small acute infarcts; one is at the anterior left temporal  lobe and the other is in the left cerebellar peduncle. 2. No acute intracranial hemorrhage. 3. Volume loss with cerebral white matter disease, likely ischemic  microvascular in nature. This document has been electronically signed by: Anabelle Sands MD on  12/15/2022 08:37 PM      CT HEAD WO CONTRAST (reviewed)    Narrative  CT head without contrast    Comparison: MRI brain November 22, 2022    Findings:  No intracranial mass, midline shift, hydrocephalus, or acute hemorrhage. Minimal changes of chronic microvascular ischemic disease. Mild mucosal thickening of the ethmoid sinuses. The orbits are unremarkable. No skull fracture. Impression  1. No acute intracranial process. 2. Minimal changes of chronic microvascular ischemic disease. This document has been electronically signed by: Yinka Desir DO on  12/15/2022 03:26 AM    All CTs at this facility use dose modulation techniques and iterative  reconstructions, and/or weight-based dosing  when appropriate to reduce radiation to a low as reasonably achievable. Cardiac echo 12/16/22:  Conclusions      Summary   Ejection fraction is visually estimated at 60%.    Overall left ventricular function is normal.      Signature ----------------------------------------------------------------   Electronically signed by Oxana De La Cruz MD (Interpreting   physician) on 12/16/2022 at 07:26 PM   ----------------------------------------------------------------        We reviewed the patient records from referring provider and available information in the EHR       ASSESSMENT:      Diagnosis Orders   1. Cerebrovascular accident (CVA) due to embolism of precerebral artery (HCC)           This is a 73 year old female with with ischemic infarct of the left anterior temporal lobe and left cerebellar peduncle ischemic stroke in December 2022. I reviewed the MRI Brain 12/15/2022, and agree with interpretation, I also reviewed the patient pertinent labs and records in the EHR and from other providers. The CTA head and neck done 12/16/22 showed no large vessel occlusion, or concerning findings.She is now on aspirin and plavix. Her neurologic Exam is nonfocal. The patient was counseled about her symptoms and work up recommended, she was also counseled about medication options and side effects. Will need to undergo 30 day cardiac event monitor to screen for underlying occult arrhythmia as well as homocystine level to complete stroke work up  Counseled on importance of modifying risk factors for stroke. After detailed discussion with patient we agreed on the following plan.         Plan    Continue with dual antiplatelets  Continue with lipid lowering mediation, target LDL below 70   30 day cardiac event monitor  Homocystine level  Modify risk factors for stroke (blood pressure, cholesterol, diabetes, smoking cessation etc.. Control)  Call with any new symptoms or concerns.   Follow up as needed    Total time 63 min    Lakshmi Muñoz MD

## 2023-03-06 ENCOUNTER — FOLLOWUP TELEPHONE ENCOUNTER (OUTPATIENT)
Dept: NEUROLOGY | Age: 73
End: 2023-03-06

## 2023-03-24 ENCOUNTER — TELEPHONE (OUTPATIENT)
Dept: NEUROLOGY | Age: 73
End: 2023-03-24

## 2023-03-24 DIAGNOSIS — R94.31 ABNORMAL PATIENT-ACTIVATED CARDIAC EVENT MONITOR: ICD-10-CM

## 2023-03-24 DIAGNOSIS — I63.10 CEREBROVASCULAR ACCIDENT (CVA) DUE TO EMBOLISM OF PRECEREBRAL ARTERY (HCC): Primary | ICD-10-CM

## 2023-03-24 NOTE — TELEPHONE ENCOUNTER
----- Message from REBECA Rodriguez CNP sent at 3/24/2023  7:53 AM EDT -----  Please let patient know her cardiac event monitor is abnormal showing Short runs of atrial flutter and atrial fibrillation  She needs to be seen by cardiology, does she have a preference?   Brooke Rodrigues CNP

## 2023-03-24 NOTE — TELEPHONE ENCOUNTER
Referral has been placed to cardiology. Please contact patient. Megan Borjas CNP requesting appointment soon. Thank you.

## 2023-03-24 NOTE — TELEPHONE ENCOUNTER
Spoke with patient who requested referral to  [facility]. Please advise. Thank you.

## 2023-03-28 NOTE — TELEPHONE ENCOUNTER
Pt returned call. She states our providers are out of network for her and she cannot afford to pay out of pocket. She is not wanting to schedule an appointment. Encouraged her to let P. Leopold's office know. Encounter routed to her staff as well.

## 2023-03-29 NOTE — TELEPHONE ENCOUNTER
Spoke with patient who stated she will contact insurance and call office back with referral preference who is in network.

## 2023-08-09 ENCOUNTER — HOSPITAL ENCOUNTER (OUTPATIENT)
Dept: MRI IMAGING | Age: 73
Discharge: HOME | End: 2023-08-09
Payer: MEDICARE

## 2023-08-09 DIAGNOSIS — Z00.6 CLINICAL TRIAL PARTICIPANT: ICD-10-CM

## 2023-08-09 PROCEDURE — 70551 MRI BRAIN STEM W/O DYE: CPT

## 2023-09-20 ENCOUNTER — HOSPITAL ENCOUNTER (OUTPATIENT)
Dept: MRI IMAGING | Age: 73
Discharge: HOME | End: 2023-09-20
Payer: COMMERCIAL

## 2023-09-20 DIAGNOSIS — Z00.6 CLINICAL TRIAL PARTICIPANT: ICD-10-CM

## 2023-09-20 PROCEDURE — 70551 MRI BRAIN STEM W/O DYE: CPT

## 2023-11-15 ENCOUNTER — HOSPITAL ENCOUNTER (OUTPATIENT)
Dept: MRI IMAGING | Age: 73
Discharge: HOME | End: 2023-11-15
Payer: MEDICARE

## 2023-11-15 DIAGNOSIS — Z00.6 CLINICAL TRIAL PARTICIPANT: ICD-10-CM

## 2023-11-15 PROCEDURE — 70551 MRI BRAIN STEM W/O DYE: CPT

## 2023-12-14 ENCOUNTER — TELEPHONE (OUTPATIENT)
Dept: SURGERY | Age: 73
End: 2023-12-14

## 2023-12-14 NOTE — TELEPHONE ENCOUNTER
Left voicemail; NP referral from [facility] for colonoscopy w/ Dr. Sadie Matias

## 2024-01-16 ENCOUNTER — HOSPITAL ENCOUNTER (OUTPATIENT)
Dept: MRI IMAGING | Age: 74
Discharge: HOME | End: 2024-01-16
Payer: COMMERCIAL

## 2024-01-16 DIAGNOSIS — Z00.6 CLINICAL TRIAL PARTICIPANT: ICD-10-CM

## 2024-01-16 PROCEDURE — 70551 MRI BRAIN STEM W/O DYE: CPT

## 2024-02-20 PROBLEM — R42 VERTIGO: Status: RESOLVED | Noted: 2022-12-15 | Resolved: 2024-02-20

## 2024-02-20 PROBLEM — E78.5 HYPERLIPIDEMIA: Status: ACTIVE | Noted: 2023-01-20

## 2024-02-20 PROBLEM — I10 ESSENTIAL HYPERTENSION: Status: ACTIVE | Noted: 2023-11-24

## 2024-02-20 PROBLEM — E11.9 TYPE 2 DIABETES MELLITUS (HCC): Status: ACTIVE | Noted: 2023-01-20

## 2024-02-27 ENCOUNTER — OFFICE VISIT (OUTPATIENT)
Dept: SURGERY | Age: 74
End: 2024-02-27
Payer: MEDICARE

## 2024-02-27 VITALS
RESPIRATION RATE: 18 BRPM | BODY MASS INDEX: 26.12 KG/M2 | OXYGEN SATURATION: 98 % | HEIGHT: 64 IN | HEART RATE: 79 BPM | SYSTOLIC BLOOD PRESSURE: 126 MMHG | WEIGHT: 153 LBS | TEMPERATURE: 97 F | DIASTOLIC BLOOD PRESSURE: 82 MMHG

## 2024-02-27 DIAGNOSIS — Z12.11 SCREENING FOR COLORECTAL CANCER: Primary | ICD-10-CM

## 2024-02-27 DIAGNOSIS — Z12.12 SCREENING FOR COLORECTAL CANCER: Primary | ICD-10-CM

## 2024-02-27 PROCEDURE — 99203 OFFICE O/P NEW LOW 30 MIN: CPT | Performed by: SURGERY

## 2024-02-27 RX ORDER — SERTRALINE HYDROCHLORIDE 25 MG/1
25 TABLET, FILM COATED ORAL DAILY
COMMUNITY
Start: 2024-02-01

## 2024-02-27 RX ORDER — DAPAGLIFLOZIN 10 MG/1
10 TABLET, FILM COATED ORAL DAILY
COMMUNITY
Start: 2024-02-05

## 2024-02-27 RX ORDER — LANOLIN ALCOHOL/MO/W.PET/CERES
1 CREAM (GRAM) TOPICAL EVERY OTHER DAY
COMMUNITY
Start: 2023-12-14

## 2024-02-27 RX ORDER — SITAGLIPTIN 50 MG/1
50 TABLET, FILM COATED ORAL DAILY
COMMUNITY

## 2024-02-27 RX ORDER — GLIMEPIRIDE 1 MG/1
1 TABLET ORAL DAILY
COMMUNITY
Start: 2023-12-31

## 2024-02-27 RX ORDER — CEPHALEXIN 500 MG/1
CAPSULE ORAL
COMMUNITY
Start: 2024-02-09

## 2024-02-27 NOTE — PROGRESS NOTES
Scars None   CARDIOVASCULAR: Heart sounds are normal.  Regular rate and rhythm without murmur, gallop or rub. Normal S1 and S2. Carotid and femoral pulses 2+/4 and equal bilaterally.  ABDOMEN: Normal shape. Laparoscopic scar(s) present. Normal bowel sounds.  No bruits. soft, nontender, nondistended, no masses or organomegaly. no evidence of hernia. Percussion: Normal without hepatosplenomegally.   RECTAL: deferred, not clinically indicated  NEUROLOGIC: There are no focalizing motor or sensory deficits. CN II-XII are grossly intact..   EXTREMITIES: no cyanosis, no clubbing, and no edema.      Thank you for the interesting evaluation. Further recommendations as listed above.       Electronically signed by Bassem Nieves DO on 2/27/2024 at 1:12 PM

## 2024-03-26 RX ORDER — SODIUM CHLORIDE 0.9 % (FLUSH) 0.9 %
5-40 SYRINGE (ML) INJECTION EVERY 12 HOURS SCHEDULED
Status: DISCONTINUED | OUTPATIENT
Start: 2024-03-26 | End: 2024-03-28 | Stop reason: HOSPADM

## 2024-03-26 RX ORDER — SODIUM CHLORIDE 9 MG/ML
INJECTION, SOLUTION INTRAVENOUS CONTINUOUS
Status: DISCONTINUED | OUTPATIENT
Start: 2024-03-26 | End: 2024-03-28 | Stop reason: HOSPADM

## 2024-03-26 RX ORDER — SODIUM CHLORIDE 0.9 % (FLUSH) 0.9 %
5-40 SYRINGE (ML) INJECTION PRN
Status: DISCONTINUED | OUTPATIENT
Start: 2024-03-26 | End: 2024-03-28 | Stop reason: HOSPADM

## 2024-03-26 RX ORDER — SODIUM CHLORIDE 9 MG/ML
25 INJECTION, SOLUTION INTRAVENOUS PRN
Status: DISCONTINUED | OUTPATIENT
Start: 2024-03-26 | End: 2024-03-28 | Stop reason: HOSPADM

## 2024-03-27 NOTE — DISCHARGE INSTRUCTIONS
ENDOSCOPY DISCHARGE INSTRUCTION SHEET  EGD/COLONOSCOPY  [facility]  3/28/2024    Call your doctor at [phone] if any of the following symptoms occur at anytime   *Excessive pain - a few cramps are to be expected   *Temperature above 100 degrees    *Excessive bleeding or large clots of blood. Don't worry about a few specks of blood.   *Repeated nausea and vomiting.    2.    Follow as needed      [address]  [place] [address]  Electronically signed by Bassem Nieves DO on 3/28/2024 at 9:44 AM

## 2024-03-27 NOTE — H&P
DO [facility] DR GENERAL SURGERY  PRE SEDATION HISTORY AND PHYSICAL      Pt Name: Tamara Sales  MRN: [id number]  YOB: 1950  Provider Performing Procedure: DO [facility]  Primary Care Physician: Milly Fraser, APRN - CNP  PRE-PROCEDURE   DNR-CCA/DNR-CC - No  Brief History/Pre-Procedure Diagnosis  Pre-Op Diagnosis Codes:     * Encounter for screening colonoscopy [Z12.11]   MEDICAL HISTORY       has a past medical history of Cerebral artery occlusion with cerebral infarction (HCC), Diabetes mellitus (HCC), Hypercholesteremia, and Hypertension.  SURGICAL HISTORY   has a past surgical history that includes Cataract removal with implant (Bilateral, 12/15/2022); Tubal ligation (08/24/1975); and Colonoscopy.  ALLERGIES   Allergies as of 02/27/2024 - Fully Reviewed 02/27/2024   Allergen Reaction Noted    Other  12/14/2022     MEDICATIONS   Coumadin Use Last 7 Days: No  Antiplatelet drug therapy use last 7 days: No  Other anticoagulant use last 7 days: No  Prior to Admission medications    Medication Sig Start Date End Date Taking? Authorizing Provider   naproxen (NAPROSYN) 250 MG tablet Take 1 tablet by mouth 2 times daily (with meals)   Yes Lana Antoine MD   FARXIGA 10 MG tablet Take 1 tablet by mouth daily 2/5/24   Lana Antoine MD   diclofenac sodium (VOLTAREN) 1 % GEL apply to affected area four times a day 2/9/24   Lana Antoine MD   ferrous sulfate (FE TABS 325) 325 (65 Fe) MG EC tablet Take 1 tablet by mouth every other day 12/14/23   Lana Antoine MD   glimepiride (AMARYL) 1 MG tablet Take 1 tablet by mouth daily 12/31/23   Lana Antoine MD   glipiZIDE (GLUCOTROL PO) 1MG DAILY    Lana Antoine MD   Melatonin 1 MG CHEW Take 2 tablets as needed by oral route at bedtime.    Lana Antoine MD   sertraline (ZOLOFT) 25 MG tablet Take 1 tablet by mouth daily 2/1/24   Lana Antoine MD   JANUVIA 50 MG

## 2024-03-27 NOTE — OP NOTE
was slowly retracted, intraluminal structures inspected. The scope was retracted through the cecum, ascending colon, transverse colon, descending colon, sigmoid colon and rectum which were without pathology with the exception of severe diverticulosis without diverticulitis and significant colon spasticity.  The colonoscope was then removed after the colon was decompressed.  The patient tolerated procedure well without immediate complication evident.    Postoperative findings were discussed with the patient's family. She was given discharge instructions and will follow up as needed.      Electronically signed by Bassem Nieves DO on 3/28/2024 at 9:43 AM

## 2024-03-28 ENCOUNTER — HOSPITAL ENCOUNTER (OUTPATIENT)
Age: 74
Setting detail: OUTPATIENT SURGERY
Discharge: HOME | End: 2024-03-28
Attending: SURGERY | Admitting: SURGERY
Payer: MEDICARE

## 2024-03-28 VITALS
HEART RATE: 60 BPM | RESPIRATION RATE: 16 BRPM | SYSTOLIC BLOOD PRESSURE: 118 MMHG | HEIGHT: 64 IN | BODY MASS INDEX: 26.12 KG/M2 | WEIGHT: 153 LBS | TEMPERATURE: 96.2 F | OXYGEN SATURATION: 93 % | DIASTOLIC BLOOD PRESSURE: 58 MMHG

## 2024-03-28 PROCEDURE — 2580000003 HC RX 258: Performed by: SURGERY

## 2024-03-28 PROCEDURE — 6360000002 HC RX W HCPCS: Performed by: SURGERY

## 2024-03-28 PROCEDURE — 7100000011 HC PHASE II RECOVERY - ADDTL 15 MIN: Performed by: SURGERY

## 2024-03-28 PROCEDURE — 7100000010 HC PHASE II RECOVERY - FIRST 15 MIN: Performed by: SURGERY

## 2024-03-28 PROCEDURE — 3609027000 HC COLONOSCOPY: Performed by: SURGERY

## 2024-03-28 PROCEDURE — 2709999900 HC NON-CHARGEABLE SUPPLY: Performed by: SURGERY

## 2024-03-28 RX ORDER — FENTANYL CITRATE 50 UG/ML
INJECTION, SOLUTION INTRAMUSCULAR; INTRAVENOUS PRN
Status: DISCONTINUED | OUTPATIENT
Start: 2024-03-28 | End: 2024-03-28 | Stop reason: ALTCHOICE

## 2024-03-28 RX ORDER — NAPROXEN 250 MG/1
250 TABLET ORAL 2 TIMES DAILY WITH MEALS
COMMUNITY

## 2024-03-28 RX ORDER — MIDAZOLAM HYDROCHLORIDE 1 MG/ML
INJECTION INTRAMUSCULAR; INTRAVENOUS PRN
Status: DISCONTINUED | OUTPATIENT
Start: 2024-03-28 | End: 2024-03-28 | Stop reason: ALTCHOICE

## 2024-03-28 RX ADMIN — SODIUM CHLORIDE: 9 INJECTION, SOLUTION INTRAVENOUS at 09:16

## 2024-03-28 ASSESSMENT — PAIN - FUNCTIONAL ASSESSMENT
PAIN_FUNCTIONAL_ASSESSMENT: NONE - DENIES PAIN
PAIN_FUNCTIONAL_ASSESSMENT: 0-10

## 2024-03-28 NOTE — PROGRESS NOTES
Drowsy. Arouses easily to verbal stimuli. Denies pain or discomfort. Dr Nieves spoke with pt and sister regarding findings and plan of care.

## 2024-04-01 DIAGNOSIS — Z12.11 SCREENING FOR COLORECTAL CANCER: ICD-10-CM

## 2024-04-01 DIAGNOSIS — Z12.12 SCREENING FOR COLORECTAL CANCER: ICD-10-CM

## 2025-02-14 ENCOUNTER — HOSPITAL ENCOUNTER (EMERGENCY)
Age: 75
Discharge: HOME | End: 2025-02-14
Attending: FAMILY MEDICINE
Payer: MEDICARE

## 2025-02-14 VITALS
DIASTOLIC BLOOD PRESSURE: 78 MMHG | OXYGEN SATURATION: 98 % | SYSTOLIC BLOOD PRESSURE: 145 MMHG | TEMPERATURE: 98.1 F | RESPIRATION RATE: 18 BRPM | HEART RATE: 86 BPM

## 2025-02-14 DIAGNOSIS — M25.561 ACUTE PAIN OF RIGHT KNEE: Primary | ICD-10-CM

## 2025-02-14 PROCEDURE — 99283 EMERGENCY DEPT VISIT LOW MDM: CPT

## 2025-02-14 ASSESSMENT — PAIN DESCRIPTION - ORIENTATION: ORIENTATION: RIGHT

## 2025-02-14 ASSESSMENT — PAIN - FUNCTIONAL ASSESSMENT
PAIN_FUNCTIONAL_ASSESSMENT: NONE - DENIES PAIN
PAIN_FUNCTIONAL_ASSESSMENT: 0-10

## 2025-02-14 ASSESSMENT — PAIN DESCRIPTION - DESCRIPTORS: DESCRIPTORS: ACHING

## 2025-02-14 ASSESSMENT — PAIN DESCRIPTION - LOCATION: LOCATION: KNEE

## 2025-02-14 ASSESSMENT — PAIN SCALES - GENERAL: PAINLEVEL_OUTOF10: 5

## 2025-02-15 NOTE — ED TRIAGE NOTES
Pt informed that we can not do DVT study in the ER here at [facility]. Pt understands.    You were seen in the emergency department for postoperative abdominal pain.  You had a CT scan two days ago that showed that you had inflammation around the surgical site, which is normal.  You were given a prescription for Percocet; we called the management of your group home and they confirmed they have the percocet. In order to get the medicine, you have to ask for it so if there is any significant pain understand the percocet is available. Please see your primary care doctor if you need additional percocet.     While on this medication you should also be started on a bowel regimen which should include MiraLAX.  For the next week you may take this twice per day but you may also titrate this to 2 bowel movements per    You should return to the emergency department if you develop any new or worsening symptoms, including nausea/vomiting, fever/chills, not having bowel movements, or any other new or concerning symptoms that you would like to evaluated.    Acute Abdominal Pain    WHAT YOU NEED TO KNOW:  The cause of your abdominal pain may not be found. If a cause is found, treatment will depend on what the cause is.    DISCHARGE INSTRUCTIONS:    Seek care immediately if:  You vomit blood or cannot stop vomiting.  You have blood in your bowel movement or it looks like tar.  You have bleeding from your rectum.  Your abdomen is larger than usual, more painful, and hard.  You have severe pain in your abdomen.  You stop passing gas and having bowel movements.  You feel weak, dizzy, or faint.  Contact your healthcare provider if:  You have a fever.  You have new signs and symptoms.  Your symptoms do not get better with treatment.  You have questions or concerns about your condition or care.  Medicines may be given to decrease pain, treat an infection, and manage your symptoms. Take your medicine as directed. Call your healthcare provider if you think your medicine is not helping or if you have side effects. Tell him if you are allergic to any medicine. Keep a list of the medicines, vitamins, and herbs you take. Include the amounts, and when and why you take them. Bring the list or the pill bottles to follow-up visits. Carry your medicine list with you in case of an emergency.    Manage your symptoms:    Apply heat on your abdomen for 20 to 30 minutes every 2 hours for as many days as directed. Heat helps decrease pain and muscle spasms.  Manage your stress. Stress may cause abdominal pain. Your healthcare provider may recommend relaxation techniques and deep breathing exercises to help decrease your stress. Your healthcare provider may recommend you talk to someone about your stress or anxiety, such as a counselor or a trusted friend. Get plenty of sleep and exercise regularly.  Limit or do not drink alcohol. Alcohol can make your abdominal pain worse. Ask your healthcare provider if it is safe for you to drink alcohol. Also ask how much is safe for you to drink.  Do not smoke. Nicotine and other chemicals in cigarettes can damage your esophagus and stomach. Ask your healthcare provider for information if you currently smoke and need help to quit. E-cigarettes or smokeless tobacco still contain nicotine. Talk to your healthcare provider before you use these products.  Make changes to the food you eat as directed: Do not eat foods that cause abdominal pain or other symptoms. Eat small meals more often.  Eat more high-fiber foods if you are constipated. High-fiber foods include fruits, vegetables, whole-grain foods, and legumes.  Do not eat foods that cause gas if you have bloating. Examples include broccoli, cabbage, and cauliflower. Do not drink soda or carbonated drinks, because these may also cause gas.  Do not eat foods or drinks that contain sorbitol or fructose if you have diarrhea and bloating. Some examples are fruit juices, candy, jelly, and sugar-free gum.  Do not eat high-fat foods, such as fried foods, cheeseburgers, hot dogs, and desserts.  Limit or do not drink caffeine. Caffeine may make symptoms, such as heart burn or nausea, worse.  Drink plenty of liquids to prevent dehydration from diarrhea or vomiting. Ask your healthcare provider how much liquid to drink each day and which liquids are best for you.  Follow up with your healthcare provider as directed: Write down your questions so you remember to ask them during your visits.

## 2025-02-15 NOTE — DISCHARGE INSTRUCTIONS
Follow up with outpatient ultrasound. If worsening knee pain or shortness of breath than go to nearest emergency room. Follow up with PCP.

## 2025-02-15 NOTE — ED TRIAGE NOTES
Pt comes into ER room 5 walked from triage with right knee pain that has been ongoing for a long time. She is concerned because the right knee is more swollen than normal. She is here because she is concerned of a blood clot. Dr. Lewis notified and he will write an out patient order for a DVT study.

## 2025-02-15 NOTE — ED PROVIDER NOTES
[facility]  eMERGENCY dEPARTMENT eNCOUnter          CHIEF COMPLAINT       Chief Complaint   Patient presents with    Knee Pain       Nurses Notes reviewed and I agree except as noted in the HPI.      HISTORY OF PRESENT ILLNESS    Tamara Sales is a 75 y.o. female who presents with pain to right posterior knee area.  She denies any known injury.  Pain started a few days ago.  She denies any significant swelling to the right leg.  Patient is concerned about a possible blood clot.  She denies any chest pain or shortness of breath.  Denies any recent travel no history of blood clots in the past.       REVIEW OF SYSTEMS     Review of Systems   Constitutional:  Negative for chills and fever.   Respiratory:  Negative for cough and shortness of breath.    Cardiovascular:  Negative for chest pain and palpitations.   Gastrointestinal:  Negative for nausea and vomiting.   Musculoskeletal:  Positive for arthralgias (right knee pain). Negative for joint swelling.   Skin:  Negative for pallor and rash.   All other systems reviewed and are negative.     PAST MEDICAL HISTORY    has a past medical history of Cerebral artery occlusion with cerebral infarction (HCC), Diabetes mellitus (HCC), Hypercholesteremia, and Hypertension.    SURGICAL HISTORY      has a past surgical history that includes Cataract removal with implant (Bilateral, 12/15/2022); Tubal ligation (08/24/1975); Colonoscopy; and Colonoscopy (N/A, 3/28/2024).    CURRENT MEDICATIONS       Discharge Medication List as of 2/14/2025  9:25 PM        CONTINUE these medications which have NOT CHANGED    Details   naproxen (NAPROSYN) 250 MG tablet Take 1 tablet by mouth 2 times daily (with meals)Historical Med      FARXIGA 10 MG tablet Take 1 tablet by mouth daily, DAWHistorical Med      diclofenac sodium (VOLTAREN) 1 % GEL apply to affected area four times a day, Historical Med      ferrous sulfate (FE TABS 325) 325 (65 Fe) MG EC tablet Take 1 tablet by mouth

## 2025-02-25 ENCOUNTER — HOSPITAL ENCOUNTER (OUTPATIENT)
Dept: ULTRASOUND IMAGING | Age: 75
Discharge: HOME | End: 2025-02-25
Attending: FAMILY MEDICINE
Payer: MEDICARE

## 2025-02-25 DIAGNOSIS — M25.561 ACUTE PAIN OF RIGHT KNEE: ICD-10-CM

## 2025-02-25 PROCEDURE — 93971 EXTREMITY STUDY: CPT

## 2025-04-28 NOTE — TELEPHONE ENCOUNTER
First attempt. Spoke with patient who stated she was would like office to call back later with test results. --EXAM--  VITAL SIGNS: I have reviewed vs documented at present.  CONSTITUTIONAL: Well-developed; well-nourished; in no acute distress.   SKIN: Warm and dry, no acute rash.   HEAD: Normocephalic; atraumatic.  EYES: PERRL, EOM intact; conjunctiva and sclera clear. No nystagmus.  ENT: No nasal discharge; airway clear.  NECK: Supple; non tender.  CARD: S1, S2, Regular rate and rhythm.   RESP: No wheezes, rales or rhonchi.

## (undated) DEVICE — GLOVE ORTHO 8   MSG9480